# Patient Record
Sex: MALE | Race: WHITE | NOT HISPANIC OR LATINO | Employment: FULL TIME | URBAN - METROPOLITAN AREA
[De-identification: names, ages, dates, MRNs, and addresses within clinical notes are randomized per-mention and may not be internally consistent; named-entity substitution may affect disease eponyms.]

---

## 2020-03-19 ENCOUNTER — OFFICE VISIT (OUTPATIENT)
Dept: FAMILY MEDICINE CLINIC | Facility: CLINIC | Age: 35
End: 2020-03-19

## 2020-03-19 VITALS
RESPIRATION RATE: 16 BRPM | BODY MASS INDEX: 21.82 KG/M2 | HEIGHT: 74 IN | HEART RATE: 72 BPM | OXYGEN SATURATION: 99 % | SYSTOLIC BLOOD PRESSURE: 120 MMHG | WEIGHT: 170 LBS | DIASTOLIC BLOOD PRESSURE: 80 MMHG

## 2020-03-19 DIAGNOSIS — J45.20 MILD INTERMITTENT ASTHMA WITHOUT COMPLICATION: ICD-10-CM

## 2020-03-19 DIAGNOSIS — F90.0 ADHD (ATTENTION DEFICIT HYPERACTIVITY DISORDER), INATTENTIVE TYPE: ICD-10-CM

## 2020-03-19 DIAGNOSIS — Z79.899 HIGH RISK MEDICATION USE: ICD-10-CM

## 2020-03-19 DIAGNOSIS — Z11.59 ENCOUNTER FOR HEPATITIS C SCREENING TEST FOR LOW RISK PATIENT: ICD-10-CM

## 2020-03-19 DIAGNOSIS — Z00.00 ANNUAL PHYSICAL EXAM: Primary | ICD-10-CM

## 2020-03-19 PROCEDURE — 99395 PREV VISIT EST AGE 18-39: CPT | Performed by: NURSE PRACTITIONER

## 2020-03-19 RX ORDER — DEXTROAMPHETAMINE SACCHARATE, AMPHETAMINE ASPARTATE MONOHYDRATE, DEXTROAMPHETAMINE SULFATE AND AMPHETAMINE SULFATE 5; 5; 5; 5 MG/1; MG/1; MG/1; MG/1
20 CAPSULE, EXTENDED RELEASE ORAL EVERY MORNING
Qty: 30 CAPSULE | Refills: 0
Start: 2020-03-19 | End: 2020-03-20 | Stop reason: SDUPTHER

## 2020-03-19 RX ORDER — ALBUTEROL SULFATE 90 UG/1
2 AEROSOL, METERED RESPIRATORY (INHALATION) EVERY 4 HOURS PRN
Qty: 1 INHALER | Refills: 2 | Status: SHIPPED | OUTPATIENT
Start: 2020-03-19 | End: 2020-10-21 | Stop reason: SDUPTHER

## 2020-03-19 NOTE — PROGRESS NOTES
"Patient ID: Bora Siddiqui is a 34 y.o. male     Subjective     Chief Complaint   Patient presents with   • Establish Care   • ADHD   • Annual Exam       History of Present Illness    Bora Siddiqui presents to the office today to establish care with our office for annual physical exam.  His main concerns is ADHD. Worsening symptoms over the past 1-2 years.  Difficulty staying on task and finish task in timely manner.  Works as  at [employer].  Recently admonished at work due to decreased attention span and able to complete task.    ADHD testing completed per Lizandro Damon LPA on 2/25/2020. Recommend medication management.  Tried different strategies to cope with symptoms however was unsuccessful.  He attempted to take frequent notes and construct list to stay on track.    During school, he was a \"B\" student. However he feels he possibly suffered from attention deficit however was never treated for condition.      Past medical history consist of asthma.  Usually controlled with as needed albuterol inhaler.  Symptoms cause intermittent flareups during change of seasons.  He has concerns about working in the public due to his history of asthma.    Health Habits:  Dental Exam: Not Up to date  Eye Exam: Up to date  Diet:Mostly vegetarian  Exercise: 3 times/week.  Current exercise activities include: walking and hiking.  Walks 10 miles a day.      He denies any complaints of fever, chills, cough, chest pain, shortness of air, abdominal pain, nausea, or any other concerns.     The following portions of the patient's history were reviewed and updated as appropriate: allergies, current medications, past family history, past medical history, past social history, past surgical history and problem list.       Review of Systems   Constitution: Negative.   HENT: Negative.    Eyes: Negative.    Cardiovascular: Negative.    Respiratory: Negative.    Endocrine: Negative.    Hematologic/Lymphatic: Negative.    Skin: Negative.  "   Musculoskeletal: Negative.    Gastrointestinal: Negative.    Genitourinary: Negative.    Neurological: Negative.    Psychiatric/Behavioral: Negative.         ADHD       Vitals:    03/19/20 0924   BP: 120/80   Pulse: 72   Resp: 16   SpO2: 99%       Documented weights    03/19/20 0924   Weight: 77.1 kg (170 lb)     Body mass index is 21.97 kg/m².    No results found for this or any previous visit.    Objective     Physical Exam   Constitutional: He is oriented to person, place, and time. Vital signs are normal. He appears well-developed.   HENT:   Head: Normocephalic and atraumatic.   Right Ear: Tympanic membrane normal.   Left Ear: Tympanic membrane normal.   Mouth/Throat: Oropharynx is clear and moist.   Eyes: Pupils are equal, round, and reactive to light. EOM are normal.   Neck: Normal range of motion. Neck supple.   Cardiovascular: Normal rate, regular rhythm, normal heart sounds and intact distal pulses.   No murmur heard.  Pulmonary/Chest: Effort normal and breath sounds normal. He has no wheezes. He has no rhonchi. He has no rales.   Abdominal: Soft. Bowel sounds are normal. There is no hepatosplenomegaly. There is no tenderness.   Musculoskeletal: Normal range of motion. He exhibits no edema or tenderness.   Neurological: He is alert and oriented to person, place, and time. He has normal strength.   Skin: Skin is warm and dry. No rash noted. No cyanosis or erythema.   Psychiatric: He has a normal mood and affect. His behavior is normal.       Assessment/Plan     Assessment/Plan     Bora was seen today for [facility] care, adhd and annual exam.    Diagnoses and all orders for this visit:    Annual physical exam  -     CBC & Differential; Future  -     Comprehensive Metabolic Panel; Future  -     Lipid Panel; Future  -     TSH; Future  -     Vitamin D 25 Hydroxy; Future    Mild intermittent asthma without complication  -     albuterol sulfate  (90 Base) MCG/ACT inhaler; Inhale 2 puffs Every 4 (Four)  Hours As Needed for Wheezing or Shortness of Air.    High risk medication use  -     Compliance Drug Analysis, Ur - Urine, Clean Catch    Encounter for hepatitis C screening test for low risk patient  -     Hepatitis C Antibody; Future    ADHD (attention deficit hyperactivity disorder), inattentive type  -     amphetamine-dextroamphetamine XR (Adderall XR) 20 MG 24 hr capsule; Take 1 capsule by mouth Every Morning      Summary:  Bora Siddiqui presents to the office today for new patient evaluation.  He is not fasting today.  We will have him scheduled for a fasting lab appointment for annual physical and will call him with results.  Albuterol inhaler given to use as needed for wheezing or shortness of breath.  Note given for work due to his history of asthma.  He had office records from psychologist office on his iPad which I have reviewed.  Instructed will need to obtain copy for his chart however do feel he would benefit from medication for control of his ADHD.  Especially since he has tried other measures and was unsuccessful in staying on task.  Instructed we will start him on Adderall XR 20 mg daily.  We will have him return in 1 month for medication recheck appointment.    In the meantime, instructed to contact us sooner for any problems or concerns.    The patient has read and signed the [facility] Controlled Substance Contract. I will continue to see patient for regular follow up appointments. [facility] #[id number] obtained on 3/19/2020 was reviewed and is compliant and will be updated at least every 3 months. The patient is aware of the potential for addiction and dependence. Will send for new prescription request to Dr. Alvarez for Adderall XR 20 mg daily #30.  No refill.  UDS completed today.  F/U in 4 weeks for medication recheck.    Patient Counseling:  --Nutrition: Stressed importance of moderation in sodium/caffeine intake, saturated fat and cholesterol.  Discussed caloric balance, sufficient intake of  fresh fruits, vegetables, fiber,   calcium, iron.  --Exercise: Stressed the importance of regular exercise by incorporating into daily routine.    --Substance Abuse: Discussed cessation/primary prevention of tobacco, alcohol, or other drug use; driving or other dangerous activities under the influence.    --Dental health: Discussed importance of regular tooth brushing, flossing, and dental visits.  -- Suggested having eyes and vision checked if needed or past due.  --Immunizations reviewed.    Queta Mason, APRN  Family Medicine  [facility] [place]  03/20/20  09:20

## 2020-03-20 DIAGNOSIS — F90.0 ADHD (ATTENTION DEFICIT HYPERACTIVITY DISORDER), INATTENTIVE TYPE: ICD-10-CM

## 2020-03-21 RX ORDER — DEXTROAMPHETAMINE SACCHARATE, AMPHETAMINE ASPARTATE MONOHYDRATE, DEXTROAMPHETAMINE SULFATE AND AMPHETAMINE SULFATE 5; 5; 5; 5 MG/1; MG/1; MG/1; MG/1
20 CAPSULE, EXTENDED RELEASE ORAL EVERY MORNING
Qty: 30 CAPSULE | Refills: 0 | Status: SHIPPED | OUTPATIENT
Start: 2020-03-21 | End: 2020-04-16 | Stop reason: SDUPTHER

## 2020-03-24 LAB — DRUGS UR: NORMAL

## 2020-03-26 ENCOUNTER — RESULTS ENCOUNTER (OUTPATIENT)
Dept: FAMILY MEDICINE CLINIC | Facility: CLINIC | Age: 35
End: 2020-03-26

## 2020-03-26 DIAGNOSIS — Z11.59 ENCOUNTER FOR HEPATITIS C SCREENING TEST FOR LOW RISK PATIENT: ICD-10-CM

## 2020-03-26 DIAGNOSIS — Z00.00 ANNUAL PHYSICAL EXAM: ICD-10-CM

## 2020-04-16 ENCOUNTER — TELEMEDICINE (OUTPATIENT)
Dept: FAMILY MEDICINE CLINIC | Facility: CLINIC | Age: 35
End: 2020-04-16

## 2020-04-16 DIAGNOSIS — F90.0 ADHD (ATTENTION DEFICIT HYPERACTIVITY DISORDER), INATTENTIVE TYPE: Primary | ICD-10-CM

## 2020-04-16 PROCEDURE — 99213 OFFICE O/P EST LOW 20 MIN: CPT | Performed by: FAMILY MEDICINE

## 2020-04-16 RX ORDER — DEXTROAMPHETAMINE SACCHARATE, AMPHETAMINE ASPARTATE MONOHYDRATE, DEXTROAMPHETAMINE SULFATE AND AMPHETAMINE SULFATE 5; 5; 5; 5 MG/1; MG/1; MG/1; MG/1
20 CAPSULE, EXTENDED RELEASE ORAL EVERY MORNING
Qty: 30 CAPSULE | Refills: 0 | Status: SHIPPED | OUTPATIENT
Start: 2020-04-16 | End: 2020-05-18 | Stop reason: SDUPTHER

## 2020-04-16 NOTE — PROGRESS NOTES
Subjective   Bora Siddiqui is a 34 y.o. male.     History of Present Illness   34-year-old white male with newly diagnosed ADHD-combined type here during video/telephone visit for further medical management of same.  Patient usually sees BETITO Hernandez and was last seen 1 month ago.  He was started on Adderall XR 20 mg every morning which while he was working he was using somewhere in the 830 to 9:00 in the morning range.  He would feel the effectiveness of it approximately 30 to 45 minutes after consumption of the tablet and this would be on board until approximately 730 to 8:00 at night.  Initially he did have some nausea approximately midday which has seemed to resolve with continued use.  He has noted improvement in focus and maintenance of concentration throughout his workday.  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  Patient's wife does state that he gets more done and is less easily distracted.  He feels that his focus is improved and that he is much more productive.  He denies any other side effects including no evidence of tremor, tachyarrhythmia, decreased appetite or insomnia.  He was under the impression he had to use this every day and so was doing so.  The following portions of the patient's history were reviewed and updated as appropriate: allergies, current medications, past family history, past medical history, past social history, past surgical history and problem list.    Review of Systems   Psychiatric/Behavioral: Positive for decreased concentration.       Objective   Physical Exam   Constitutional: He is oriented to person, place, and time.   Neurological: He is alert and oriented to  person, place, and time.   Psychiatric: He has a normal mood and affect. His speech is normal and behavior is normal. Judgment and thought content normal. Cognition and memory are normal.   Further exam has not been accomplished given this type of visit.      Assessment/Plan   Bora was seen today for add.    Diagnoses and all orders for this visit:    ADHD (attention deficit hyperactivity disorder), inattentive type  -     amphetamine-dextroamphetamine XR (Adderall XR) 20 MG 24 hr capsule; Take 1 capsule by mouth Every Morning    Have discussed with patient that we may not fully know the effectiveness of this product and whether this will be the appropriate medication and dose until after he begins to work again.  Again he will be a  for a [employer] store and will not start to 4 May.  His schedule vary in time will tell whether it adjustment of this medication will need to be made.  He certainly seems to be a slow metabolizer of this product often getting approximately 12 hours out of the extended release form of this product.  Anticipate follow-up with him in approximately 4 months with SERAFIN Hernandez

## 2020-05-18 DIAGNOSIS — F90.0 ADHD (ATTENTION DEFICIT HYPERACTIVITY DISORDER), INATTENTIVE TYPE: ICD-10-CM

## 2020-05-18 RX ORDER — DEXTROAMPHETAMINE SACCHARATE, AMPHETAMINE ASPARTATE MONOHYDRATE, DEXTROAMPHETAMINE SULFATE AND AMPHETAMINE SULFATE 5; 5; 5; 5 MG/1; MG/1; MG/1; MG/1
20 CAPSULE, EXTENDED RELEASE ORAL EVERY MORNING
Qty: 30 CAPSULE | Refills: 0 | Status: SHIPPED | OUTPATIENT
Start: 2020-05-18 | End: 2020-06-15 | Stop reason: SDUPTHER

## 2020-05-18 NOTE — TELEPHONE ENCOUNTER
Last OV: 03/19/2020  Last UDS: 03/19/2020  Last Eleno: 03/19/2020  Last Refill: 04/16/2020   Normal for race

## 2020-06-15 DIAGNOSIS — F90.0 ADHD (ATTENTION DEFICIT HYPERACTIVITY DISORDER), INATTENTIVE TYPE: ICD-10-CM

## 2020-06-15 RX ORDER — DEXTROAMPHETAMINE SACCHARATE, AMPHETAMINE ASPARTATE MONOHYDRATE, DEXTROAMPHETAMINE SULFATE AND AMPHETAMINE SULFATE 5; 5; 5; 5 MG/1; MG/1; MG/1; MG/1
20 CAPSULE, EXTENDED RELEASE ORAL EVERY MORNING
Qty: 30 CAPSULE | Refills: 0 | Status: SHIPPED | OUTPATIENT
Start: 2020-06-15 | End: 2020-07-18 | Stop reason: SDUPTHER

## 2020-07-18 DIAGNOSIS — F90.0 ADHD (ATTENTION DEFICIT HYPERACTIVITY DISORDER), INATTENTIVE TYPE: ICD-10-CM

## 2020-07-20 RX ORDER — DEXTROAMPHETAMINE SACCHARATE, AMPHETAMINE ASPARTATE MONOHYDRATE, DEXTROAMPHETAMINE SULFATE AND AMPHETAMINE SULFATE 5; 5; 5; 5 MG/1; MG/1; MG/1; MG/1
20 CAPSULE, EXTENDED RELEASE ORAL EVERY MORNING
Qty: 30 CAPSULE | Refills: 0 | Status: SHIPPED | OUTPATIENT
Start: 2020-07-20 | End: 2020-08-14 | Stop reason: SDUPTHER

## 2020-08-14 DIAGNOSIS — F90.0 ADHD (ATTENTION DEFICIT HYPERACTIVITY DISORDER), INATTENTIVE TYPE: ICD-10-CM

## 2020-08-14 NOTE — TELEPHONE ENCOUNTER
He sent [employer] message about possible change in medication dose.  He is due for 4 month follow-up.  Need to make sure he schedule before sending in refill.  If out of meds, can refill current dose until seen.

## 2020-08-17 RX ORDER — DEXTROAMPHETAMINE SACCHARATE, AMPHETAMINE ASPARTATE MONOHYDRATE, DEXTROAMPHETAMINE SULFATE AND AMPHETAMINE SULFATE 5; 5; 5; 5 MG/1; MG/1; MG/1; MG/1
20 CAPSULE, EXTENDED RELEASE ORAL EVERY MORNING
Qty: 30 CAPSULE | Refills: 0 | Status: SHIPPED | OUTPATIENT
Start: 2020-08-17 | End: 2020-08-20 | Stop reason: DRUGHIGH

## 2020-08-17 NOTE — TELEPHONE ENCOUNTER
Pt says he is out.  He would like to refill current dose and discuss changing dose at OV on Thursday.

## 2020-08-20 ENCOUNTER — TELEMEDICINE (OUTPATIENT)
Dept: FAMILY MEDICINE CLINIC | Facility: CLINIC | Age: 35
End: 2020-08-20

## 2020-08-20 DIAGNOSIS — F90.0 ADHD (ATTENTION DEFICIT HYPERACTIVITY DISORDER), INATTENTIVE TYPE: ICD-10-CM

## 2020-08-20 PROCEDURE — 99213 OFFICE O/P EST LOW 20 MIN: CPT | Performed by: NURSE PRACTITIONER

## 2020-08-20 RX ORDER — DEXTROAMPHETAMINE SACCHARATE, AMPHETAMINE ASPARTATE, DEXTROAMPHETAMINE SULFATE AND AMPHETAMINE SULFATE 5; 5; 5; 5 MG/1; MG/1; MG/1; MG/1
20 TABLET ORAL 2 TIMES DAILY
Qty: 60 TABLET | Refills: 0
Start: 2020-08-20 | End: 2020-08-21 | Stop reason: SDUPTHER

## 2020-08-20 NOTE — PROGRESS NOTES
"Bora Siddiqui is a 34 y.o. who presents today for an E-visit with complaints of ADHD    Time spent caring for the patient was 5 - 10 min.    Patient ID: Bora Siddiqui is a 34 y.o. male     Subjective     Chief Complaint   Patient presents with   • ADD       History of Present Illness    Bora Siddiqui presents to the office today for e-visit for continued management of ADHD.    Has been taking Adderall XR 20 mg daily since March 2020. Usually takes medication at 0530. Usually helps him stay on task until about 1300 where he feels like he is \"zoning out\". Works till 1500.  Crashes when he gets home. Has to take a nap when he gets home which is unusual for him.  Initially thought his symptoms would improve after taking medication for awhile however has not.  Denies any adverse effects such as chest pain, heart palpitations, unintentional weight loss, decreased appetite, or any other concerns.    The following portions of the patient's history were reviewed and updated as appropriate: allergies, current medications, past family history, past medical history, past social history, past surgical history and problem list.       Review of Systems   Constitution: Positive for malaise/fatigue.   HENT: Negative.    Eyes: Negative.    Cardiovascular: Negative.    Respiratory: Negative.    Endocrine: Negative.    Hematologic/Lymphatic: Negative.    Skin: Negative.    Musculoskeletal: Negative.    Gastrointestinal: Negative.    Genitourinary: Negative.    Neurological: Negative.    Psychiatric/Behavioral: Negative.         ADD       There were no vitals filed for this visit.    There were no vitals filed for this visit.  There is no height or weight on file to calculate BMI.    Results for orders placed or performed in visit on 03/19/20   Compliance Drug Analysis, Ur - Urine, Clean Catch   Result Value Ref Range    Report Summary FINAL        Objective     Physical Exam   Constitutional: He is oriented to person, place, and time. " No distress.   Neurological: He is alert and oriented to person, place, and time.   Psychiatric: He has a normal mood and affect. His speech is normal. Thought content normal. Cognition and memory are normal.          Assessment/Plan     Assessment/Plan     oBra was seen today for add.    Diagnoses and all orders for this visit:    ADHD (attention deficit hyperactivity disorder), inattentive type  -     amphetamine-dextroamphetamine (Adderall) 20 MG tablet; Take 1 tablet by mouth 2 (Two) Times a Day.        Summary:  Bora Siddiqui present for video visit today concerning continue management of his ADHD medication.  Feel he would benefit from switching from extended release to twice a day dosing to see if helps control his symptoms better.  Will send med request for Adderall 20 mg twice a day #60 to Dr. Alvarez.  Instructed to follow-up with us in approximately 4 weeks to see how he is doing on current dosage.  Return to office in 4 months for next medication recheck appointment.    In the meantime, instructed to contact us sooner for any problems or concerns.      The patient has read and signed the [facility] Controlled Substance Contract. I will continue to see patient for regular follow up appointments. They are well controlled on their medication.  [facility] #[id number] obtained on 8/20/2020 was reviewed and is compliant and will be updated at least every 3 months. The patient is aware of the potential for addiction and dependence.  UDS completed March 2020.        There are no Patient Instructions on file for this visit.      Queta Mason, APRN  Family Medicine  [facility] [place] [facility]  08/20/20  17:16

## 2020-08-21 ENCOUNTER — TELEPHONE (OUTPATIENT)
Dept: FAMILY MEDICINE CLINIC | Facility: CLINIC | Age: 35
End: 2020-08-21

## 2020-08-21 DIAGNOSIS — F90.0 ADHD (ATTENTION DEFICIT HYPERACTIVITY DISORDER), INATTENTIVE TYPE: ICD-10-CM

## 2020-08-21 RX ORDER — DEXTROAMPHETAMINE SACCHARATE, AMPHETAMINE ASPARTATE, DEXTROAMPHETAMINE SULFATE AND AMPHETAMINE SULFATE 5; 5; 5; 5 MG/1; MG/1; MG/1; MG/1
20 TABLET ORAL 2 TIMES DAILY
Qty: 60 TABLET | Refills: 0
Start: 2020-08-21 | End: 2020-08-21 | Stop reason: SDUPTHER

## 2020-08-21 RX ORDER — DEXTROAMPHETAMINE SACCHARATE, AMPHETAMINE ASPARTATE, DEXTROAMPHETAMINE SULFATE AND AMPHETAMINE SULFATE 5; 5; 5; 5 MG/1; MG/1; MG/1; MG/1
20 TABLET ORAL 2 TIMES DAILY
Qty: 60 TABLET | Refills: 0 | Status: SHIPPED | OUTPATIENT
Start: 2020-08-21 | End: 2020-09-23 | Stop reason: SDUPTHER

## 2020-09-23 DIAGNOSIS — F90.0 ADHD (ATTENTION DEFICIT HYPERACTIVITY DISORDER), INATTENTIVE TYPE: ICD-10-CM

## 2020-09-23 RX ORDER — DEXTROAMPHETAMINE SACCHARATE, AMPHETAMINE ASPARTATE, DEXTROAMPHETAMINE SULFATE AND AMPHETAMINE SULFATE 5; 5; 5; 5 MG/1; MG/1; MG/1; MG/1
20 TABLET ORAL 2 TIMES DAILY
Qty: 60 TABLET | Refills: 0 | Status: SHIPPED | OUTPATIENT
Start: 2020-09-23 | End: 2020-10-21 | Stop reason: SDUPTHER

## 2020-10-21 DIAGNOSIS — F90.0 ADHD (ATTENTION DEFICIT HYPERACTIVITY DISORDER), INATTENTIVE TYPE: ICD-10-CM

## 2020-10-21 DIAGNOSIS — J45.20 MILD INTERMITTENT ASTHMA WITHOUT COMPLICATION: ICD-10-CM

## 2020-10-21 RX ORDER — ALBUTEROL SULFATE 90 UG/1
2 AEROSOL, METERED RESPIRATORY (INHALATION) EVERY 4 HOURS PRN
Qty: 18 G | Refills: 0 | Status: SHIPPED | OUTPATIENT
Start: 2020-10-21 | End: 2022-10-12 | Stop reason: SDUPTHER

## 2020-10-21 RX ORDER — DEXTROAMPHETAMINE SACCHARATE, AMPHETAMINE ASPARTATE, DEXTROAMPHETAMINE SULFATE AND AMPHETAMINE SULFATE 5; 5; 5; 5 MG/1; MG/1; MG/1; MG/1
20 TABLET ORAL 2 TIMES DAILY
Qty: 60 TABLET | Refills: 0 | Status: SHIPPED | OUTPATIENT
Start: 2020-10-21 | End: 2020-11-25 | Stop reason: SDUPTHER

## 2020-11-25 DIAGNOSIS — F90.0 ADHD (ATTENTION DEFICIT HYPERACTIVITY DISORDER), INATTENTIVE TYPE: ICD-10-CM

## 2020-11-25 RX ORDER — DEXTROAMPHETAMINE SACCHARATE, AMPHETAMINE ASPARTATE, DEXTROAMPHETAMINE SULFATE AND AMPHETAMINE SULFATE 5; 5; 5; 5 MG/1; MG/1; MG/1; MG/1
20 TABLET ORAL 2 TIMES DAILY
Qty: 60 TABLET | Refills: 0 | Status: SHIPPED | OUTPATIENT
Start: 2020-11-25 | End: 2021-01-06 | Stop reason: SDUPTHER

## 2020-12-29 DIAGNOSIS — F90.0 ADHD (ATTENTION DEFICIT HYPERACTIVITY DISORDER), INATTENTIVE TYPE: ICD-10-CM

## 2020-12-30 RX ORDER — DEXTROAMPHETAMINE SACCHARATE, AMPHETAMINE ASPARTATE, DEXTROAMPHETAMINE SULFATE AND AMPHETAMINE SULFATE 5; 5; 5; 5 MG/1; MG/1; MG/1; MG/1
20 TABLET ORAL 2 TIMES DAILY
Qty: 60 TABLET | Refills: 0 | OUTPATIENT
Start: 2020-12-30

## 2021-01-05 ENCOUNTER — OFFICE VISIT (OUTPATIENT)
Dept: FAMILY MEDICINE CLINIC | Facility: CLINIC | Age: 36
End: 2021-01-05

## 2021-01-05 VITALS
DIASTOLIC BLOOD PRESSURE: 80 MMHG | TEMPERATURE: 98 F | WEIGHT: 179 LBS | HEIGHT: 74 IN | BODY MASS INDEX: 22.97 KG/M2 | OXYGEN SATURATION: 98 % | SYSTOLIC BLOOD PRESSURE: 138 MMHG | HEART RATE: 98 BPM | RESPIRATION RATE: 16 BRPM

## 2021-01-05 DIAGNOSIS — F90.0 ADHD (ATTENTION DEFICIT HYPERACTIVITY DISORDER), INATTENTIVE TYPE: Primary | ICD-10-CM

## 2021-01-05 PROCEDURE — 99213 OFFICE O/P EST LOW 20 MIN: CPT | Performed by: NURSE PRACTITIONER

## 2021-01-05 NOTE — PROGRESS NOTES
"Answers for HPI/ROS submitted by the patient on 1/5/2021   What is the primary reason for your visit?: Other  Please describe your symptoms.: None. Visit is for perscription update.  Have you had these symptoms before?: Yes  How long have you been having these symptoms?: Greater than 2 weeks  Please list any medications you are currently taking for this condition.: Adderall      Chief Complaint   Patient presents with   • ADHD       Subjective   Bora Siddiqui 35 y.o. male who presents for follow up of for  ADD/ADHD. Patient is well controlled on their current Rx.  When he was last seen, he was having problems with medication wearing off and crushing at the end of the day.  At that time he was taken Adderall extended release 20 mg daily.  Advised to switch to 20 mg twice a day.  States this dosage is working better for him.  Will take the second dose during the day depending on activities.  At times if not needed, he does not need second dose during the day.  Doing well on this medication regimen.  No new problems with: insomnia, tachycardia, anxiety, elevated blood pressure or weight loss.     I will continue to see patient for regular follow up appointments.    The medication continues to help with: concentration, finishing tasks in timely manor, and succeeding at school and or at work.        History of Present Illness     The following portions of the patient's history were reviewed and updated as appropriate: allergies, current medications, past family history, past medical history, past social history, past surgical history and problem list.    Review of Systems   Psychiatric/Behavioral:        ADHD       Vitals:    01/05/21 1539   BP: 138/80   BP Location: Left arm   Patient Position: Sitting   Cuff Size: Adult   Pulse: 98   Resp: 16   Temp: 98 °F (36.7 °C)   SpO2: 98%   Weight: 81.2 kg (179 lb)   Height: 187.3 cm (73.75\")     Wt Readings from Last 3 Encounters:   01/05/21 81.2 kg (179 lb)   03/19/20 77.1 kg " (170 lb)     Objective   General:  Alert, pleasant, no distress  HEENT:  Normocephalic and atraumatic  Neck:  No thyroid megaly nor lymphadenopathy  Lungs: Normal respiratory rate, Clear auscultation bilaterally.    Heart:: Regular rate, no murmur  Skin: No rash  Neuro:  Cranial nerves grossly normal. No tremor  Psych:  Mood stable, clear thought process    Assessment/Plan   Diagnoses and all orders for this visit:    1. ADHD (attention deficit hyperactivity disorder), inattentive type (Primary)   Continue Adderall 20 mg twice a day as directed.      The patient has read and signed the [facility] Controlled Substance Contract. I will continue to see patient for regular follow up appointments. They are well controlled on their medication.  SAEID was reviewed and is compliant and will be updated at least every 3 months. The patient is aware of the potential for addiction and dependence.       There are no discontinued medications.     There are no Patient Instructions on file for this visit.  Return in about 4 months (around 5/5/2021) for Recheck, Annual.      Queta Mason, APRN

## 2021-01-06 DIAGNOSIS — F90.0 ADHD (ATTENTION DEFICIT HYPERACTIVITY DISORDER), INATTENTIVE TYPE: ICD-10-CM

## 2021-01-06 RX ORDER — DEXTROAMPHETAMINE SACCHARATE, AMPHETAMINE ASPARTATE, DEXTROAMPHETAMINE SULFATE AND AMPHETAMINE SULFATE 5; 5; 5; 5 MG/1; MG/1; MG/1; MG/1
20 TABLET ORAL 2 TIMES DAILY
Qty: 60 TABLET | Refills: 0 | Status: SHIPPED | OUTPATIENT
Start: 2021-01-06 | End: 2021-01-08 | Stop reason: SDUPTHER

## 2021-01-08 DIAGNOSIS — F90.0 ADHD (ATTENTION DEFICIT HYPERACTIVITY DISORDER), INATTENTIVE TYPE: ICD-10-CM

## 2021-01-08 RX ORDER — DEXTROAMPHETAMINE SACCHARATE, AMPHETAMINE ASPARTATE, DEXTROAMPHETAMINE SULFATE AND AMPHETAMINE SULFATE 5; 5; 5; 5 MG/1; MG/1; MG/1; MG/1
20 TABLET ORAL 2 TIMES DAILY
Qty: 60 TABLET | Refills: 0 | Status: SHIPPED | OUTPATIENT
Start: 2021-01-08 | End: 2021-02-01 | Stop reason: SDUPTHER

## 2021-02-01 DIAGNOSIS — F90.0 ADHD (ATTENTION DEFICIT HYPERACTIVITY DISORDER), INATTENTIVE TYPE: ICD-10-CM

## 2021-02-02 RX ORDER — DEXTROAMPHETAMINE SACCHARATE, AMPHETAMINE ASPARTATE, DEXTROAMPHETAMINE SULFATE AND AMPHETAMINE SULFATE 5; 5; 5; 5 MG/1; MG/1; MG/1; MG/1
20 TABLET ORAL 2 TIMES DAILY
Qty: 60 TABLET | Refills: 0 | Status: SHIPPED | OUTPATIENT
Start: 2021-02-02 | End: 2021-03-06 | Stop reason: SDUPTHER

## 2021-03-06 DIAGNOSIS — F90.0 ADHD (ATTENTION DEFICIT HYPERACTIVITY DISORDER), INATTENTIVE TYPE: ICD-10-CM

## 2021-03-08 RX ORDER — DEXTROAMPHETAMINE SACCHARATE, AMPHETAMINE ASPARTATE, DEXTROAMPHETAMINE SULFATE AND AMPHETAMINE SULFATE 5; 5; 5; 5 MG/1; MG/1; MG/1; MG/1
20 TABLET ORAL 2 TIMES DAILY
Qty: 60 TABLET | Refills: 0 | Status: SHIPPED | OUTPATIENT
Start: 2021-03-08 | End: 2021-04-06 | Stop reason: SDUPTHER

## 2021-04-06 DIAGNOSIS — F90.0 ADHD (ATTENTION DEFICIT HYPERACTIVITY DISORDER), INATTENTIVE TYPE: ICD-10-CM

## 2021-04-06 RX ORDER — DEXTROAMPHETAMINE SACCHARATE, AMPHETAMINE ASPARTATE, DEXTROAMPHETAMINE SULFATE AND AMPHETAMINE SULFATE 5; 5; 5; 5 MG/1; MG/1; MG/1; MG/1
20 TABLET ORAL 2 TIMES DAILY
Qty: 60 TABLET | Refills: 0 | Status: SHIPPED | OUTPATIENT
Start: 2021-04-06 | End: 2021-05-13 | Stop reason: SDUPTHER

## 2021-05-04 DIAGNOSIS — Z00.00 ANNUAL PHYSICAL EXAM: Primary | ICD-10-CM

## 2021-05-04 DIAGNOSIS — F90.0 ADHD (ATTENTION DEFICIT HYPERACTIVITY DISORDER), INATTENTIVE TYPE: ICD-10-CM

## 2021-05-04 DIAGNOSIS — Z11.59 NEED FOR HEPATITIS C SCREENING TEST: ICD-10-CM

## 2021-05-04 DIAGNOSIS — Z79.899 HIGH RISK MEDICATION USE: Primary | ICD-10-CM

## 2021-05-04 DIAGNOSIS — Z79.899 HIGH RISK MEDICATION USE: ICD-10-CM

## 2021-05-04 DIAGNOSIS — Z00.00 ANNUAL PHYSICAL EXAM: ICD-10-CM

## 2021-05-11 DIAGNOSIS — F90.0 ADHD (ATTENTION DEFICIT HYPERACTIVITY DISORDER), INATTENTIVE TYPE: ICD-10-CM

## 2021-05-11 LAB
25(OH)D3+25(OH)D2 SERPL-MCNC: 23.2 NG/ML (ref 30–100)
ALBUMIN SERPL-MCNC: 4.7 G/DL (ref 4–5)
ALBUMIN/GLOB SERPL: 1.7 {RATIO} (ref 1.2–2.2)
ALP SERPL-CCNC: 65 IU/L (ref 39–117)
ALT SERPL-CCNC: 25 IU/L (ref 0–44)
AMBIG ABBREV CMP14 DEFAULT: NORMAL
AMBIG ABBREV LP DEFAULT: NORMAL
AST SERPL-CCNC: 20 IU/L (ref 0–40)
BASOPHILS # BLD AUTO: 0.1 X10E3/UL (ref 0–0.2)
BASOPHILS NFR BLD AUTO: 1 %
BILIRUB SERPL-MCNC: 0.8 MG/DL (ref 0–1.2)
BUN SERPL-MCNC: 16 MG/DL (ref 6–20)
BUN/CREAT SERPL: 19 (ref 9–20)
CALCIUM SERPL-MCNC: 9.5 MG/DL (ref 8.7–10.2)
CHLORIDE SERPL-SCNC: 101 MMOL/L (ref 96–106)
CHOLEST SERPL-MCNC: 133 MG/DL (ref 100–199)
CO2 SERPL-SCNC: 24 MMOL/L (ref 20–29)
CREAT SERPL-MCNC: 0.84 MG/DL (ref 0.76–1.27)
EOSINOPHIL # BLD AUTO: 0.2 X10E3/UL (ref 0–0.4)
EOSINOPHIL NFR BLD AUTO: 4 %
ERYTHROCYTE [DISTWIDTH] IN BLOOD BY AUTOMATED COUNT: 12.1 % (ref 11.6–15.4)
GLOBULIN SER CALC-MCNC: 2.7 G/DL (ref 1.5–4.5)
GLUCOSE SERPL-MCNC: 102 MG/DL (ref 65–99)
HCT VFR BLD AUTO: 46.3 % (ref 37.5–51)
HCV AB S/CO SERPL IA: <0.1 S/CO RATIO (ref 0–0.9)
HDLC SERPL-MCNC: 56 MG/DL
HGB BLD-MCNC: 15.4 G/DL (ref 13–17.7)
IMM GRANULOCYTES # BLD AUTO: 0 X10E3/UL (ref 0–0.1)
IMM GRANULOCYTES NFR BLD AUTO: 0 %
LDLC SERPL CALC-MCNC: 67 MG/DL (ref 0–99)
LYMPHOCYTES # BLD AUTO: 1.7 X10E3/UL (ref 0.7–3.1)
LYMPHOCYTES NFR BLD AUTO: 35 %
MCH RBC QN AUTO: 28.8 PG (ref 26.6–33)
MCHC RBC AUTO-ENTMCNC: 33.3 G/DL (ref 31.5–35.7)
MCV RBC AUTO: 87 FL (ref 79–97)
MONOCYTES # BLD AUTO: 0.4 X10E3/UL (ref 0.1–0.9)
MONOCYTES NFR BLD AUTO: 9 %
NEUTROPHILS # BLD AUTO: 2.4 X10E3/UL (ref 1.4–7)
NEUTROPHILS NFR BLD AUTO: 51 %
PLATELET # BLD AUTO: 310 X10E3/UL (ref 150–450)
POTASSIUM SERPL-SCNC: 4.8 MMOL/L (ref 3.5–5.2)
PROT SERPL-MCNC: 7.4 G/DL (ref 6–8.5)
RBC # BLD AUTO: 5.35 X10E6/UL (ref 4.14–5.8)
SODIUM SERPL-SCNC: 138 MMOL/L (ref 134–144)
TRIGL SERPL-MCNC: 43 MG/DL (ref 0–149)
TSH SERPL DL<=0.005 MIU/L-ACNC: 0.9 UIU/ML (ref 0.45–4.5)
VLDLC SERPL CALC-MCNC: 10 MG/DL (ref 5–40)
WBC # BLD AUTO: 4.8 X10E3/UL (ref 3.4–10.8)

## 2021-05-11 RX ORDER — DEXTROAMPHETAMINE SACCHARATE, AMPHETAMINE ASPARTATE, DEXTROAMPHETAMINE SULFATE AND AMPHETAMINE SULFATE 5; 5; 5; 5 MG/1; MG/1; MG/1; MG/1
20 TABLET ORAL 2 TIMES DAILY
Qty: 60 TABLET | Refills: 0 | Status: CANCELLED | OUTPATIENT
Start: 2021-05-11

## 2021-05-13 ENCOUNTER — OFFICE VISIT (OUTPATIENT)
Dept: FAMILY MEDICINE CLINIC | Facility: CLINIC | Age: 36
End: 2021-05-13

## 2021-05-13 VITALS
RESPIRATION RATE: 16 BRPM | TEMPERATURE: 98.2 F | OXYGEN SATURATION: 99 % | WEIGHT: 185 LBS | BODY MASS INDEX: 23.74 KG/M2 | SYSTOLIC BLOOD PRESSURE: 124 MMHG | HEIGHT: 74 IN | DIASTOLIC BLOOD PRESSURE: 80 MMHG | HEART RATE: 88 BPM

## 2021-05-13 DIAGNOSIS — F90.0 ADHD (ATTENTION DEFICIT HYPERACTIVITY DISORDER), INATTENTIVE TYPE: ICD-10-CM

## 2021-05-13 DIAGNOSIS — Z00.00 ANNUAL PHYSICAL EXAM: Primary | ICD-10-CM

## 2021-05-13 DIAGNOSIS — E55.9 VITAMIN D INSUFFICIENCY: ICD-10-CM

## 2021-05-13 DIAGNOSIS — Z79.899 HIGH RISK MEDICATION USE: ICD-10-CM

## 2021-05-13 PROCEDURE — 99395 PREV VISIT EST AGE 18-39: CPT | Performed by: NURSE PRACTITIONER

## 2021-05-13 RX ORDER — ERGOCALCIFEROL (VITAMIN D2) 10 MCG
400 TABLET ORAL DAILY
COMMUNITY
Start: 2021-05-13 | End: 2021-11-23 | Stop reason: SDUPTHER

## 2021-05-13 NOTE — PROGRESS NOTES
Chief Complaint   Patient presents with   • Annual Exam       Patient Care Team:  Queta Mason APRN as PCP - General (Nurse Practitioner)    Subjective   Bora Siddiqui is a 35 y.o. male and is here for a yearly physical exam. The patient reports no problems.    Do you take any herbs or supplements that were not prescribed by a doctor? no. If so, these will be added to active medication list.    Health Habits:  Dental Exam: Not up to date  Eye Exam: No problems  Diet: Mostly vegetarian  Exercise: Daily  Current exercise activities include: Walking  Received request for refill of ADHD Medication.      Queta Mason APRN  Current Outpatient Medications   Medication Sig Dispense Refill   • albuterol sulfate  (90 Base) MCG/ACT inhaler Inhale 2 puffs Every 4 (Four) Hours As Needed for Wheezing or Shortness of Air. 18 g 0   • amphetamine-dextroamphetamine (Adderall) 20 MG tablet Take 1 tablet by mouth 2 (Two) Times a Day. 60 tablet 0   • Vitamin D, Cholecalciferol, (CHOLECALCIFEROL) 10 MCG (400 UNIT) tablet Take 1 tablet by mouth Daily.       No current facility-administered medications for this visit.     Last refill date of Adderall on 4/6/2021  Medication:  # dispensed: 60  # days of med left: 1  To be picked up at [facility].    Does Bora seem to have any problems with moodiness, appetite, weight loss, or sleep? no  Any complaints by Bora about taking the medications? No  Doing well on medication since going to twice a day dosing rather than extended release.  Does not always need afternoon dose.  Depends on activities for the day.  Keeps him focused and able to stay on task.    When was he last examined for ADHD? 1/5/2021   Who is he seeing for his ADHD symptoms? SERAFIN Segovia    When is his next appointment due? 4 months  Rx request routed to Dr. Alvarez for signature.    The following portions of the patient's history were reviewed and updated as appropriate: allergies, current medications, past family  "history, past medical history, past social history, past surgical history and problem list.    Social and Family and Surgical History reviewed and updated today, see Rooming tab.    Health History, Preventive Measures and Vaccination flow sheets reviewed and updated today.    Patient's current medical chart in Epic; including previous office notes, imaging, labs, specialist's evaluation either in notes or in Media tab reviewed today.    Other pertinent medical information also reviewed thru Care Everywhere function is also reviewed today.    Review of Systems  Review of Systems   Constitutional: Negative.    HENT: Negative.    Respiratory: Negative.    Cardiovascular: Negative.    Gastrointestinal: Negative.    Endocrine: Negative.    Genitourinary: Negative.    Musculoskeletal: Negative.    Skin: Negative.    Neurological: Negative.    Hematological: Negative.    Psychiatric/Behavioral: Negative.      Vitals:    05/13/21 1534   BP: 124/80   BP Location: Left arm   Patient Position: Sitting   Cuff Size: Adult   Pulse: 88   Resp: 16   Temp: 98.2 °F (36.8 °C)   SpO2: 99%   Weight: 83.9 kg (185 lb)   Height: 187.3 cm (73.75\")       General Appearance:  Alert, cooperative, no distress, appears stated age   Head:  Normocephalic, without obvious abnormality, atraumatic   Eyes:  PERRL, conjunctiva/corneas clear, EOM's intact.   Ears:  Normal TM's and external ear canals, both ears   Nose: Nares normal, septum midline, mucosa normal, no drainage or sinus tenderness   Throat: Lips, mucosa, and tongue normal; teeth and gums normal   Neck: Supple, symmetrical, trachea midline, no adenopathy;   thyroid: No enlargement/tenderness/nodules   Back:  Symmetric, no curvature, ROM normal, no CVA tenderness   Lungs:  Clear to auscultation bilaterally, respirations even and unlabored   Chest wall:  No tenderness or deformity   Heart:  Regular rate and rhythm, S1 and S2 normal, no murmur, rub or gallop   Abdomen:  Soft, non-tender, bowel " sounds active all four quadrants,   no masses, no organomegaly           Extremities: Extremities normal, atraumatic, no cyanosis or edema   Pulses: 2+ and symmetric all extremities   Skin: Skin color, texture, turgor normal, no rashes or lesions   Lymph nodes: Cervical, supraclavicular, and axillary nodes normal   Neurologic: CNII-XII intact. Normal strength, sensation and reflexes   throughout       Results for orders placed or performed in visit on 05/10/21   Comprehensive Metabolic Panel   Result Value Ref Range    Glucose 102 (H) 65 - 99 mg/dL    BUN 16 6 - 20 mg/dL    Creatinine 0.84 0.76 - 1.27 mg/dL    eGFR Non African Am 113 >59 mL/min/1.73    eGFR African Am 130 >59 mL/min/1.73    BUN/Creatinine Ratio 19 9 - 20    Sodium 138 134 - 144 mmol/L    Potassium 4.8 3.5 - 5.2 mmol/L    Chloride 101 96 - 106 mmol/L    Total CO2 24 20 - 29 mmol/L    Calcium 9.5 8.7 - 10.2 mg/dL    Total Protein 7.4 6.0 - 8.5 g/dL    Albumin 4.7 4.0 - 5.0 g/dL    Globulin 2.7 1.5 - 4.5 g/dL    A/G Ratio 1.7 1.2 - 2.2    Total Bilirubin 0.8 0.0 - 1.2 mg/dL    Alkaline Phosphatase 65 39 - 117 IU/L    AST (SGOT) 20 0 - 40 IU/L    ALT (SGPT) 25 0 - 44 IU/L   Lipid Panel   Result Value Ref Range    Total Cholesterol 133 100 - 199 mg/dL    Triglycerides 43 0 - 149 mg/dL    HDL Cholesterol 56 >39 mg/dL    VLDL Cholesterol Shakeel 10 5 - 40 mg/dL    LDL Chol Calc (NIH) 67 0 - 99 mg/dL   TSH   Result Value Ref Range    TSH 0.898 0.450 - 4.500 uIU/mL   Vitamin D 25 Hydroxy   Result Value Ref Range    25 Hydroxy, Vitamin D 23.2 (L) 30.0 - 100.0 ng/mL   Hepatitis C Antibody   Result Value Ref Range    Hep C Virus Ab <0.1 0.0 - 0.9 s/co ratio   Ambig Abbrev CMP14 Default   Result Value Ref Range    Ambig Abbrev CMP14 Default Comment    Ambig Abbrev LP Default   Result Value Ref Range    Ambig Abbrev LP Default Comment    CBC & Differential   Result Value Ref Range    WBC 4.8 3.4 - 10.8 x10E3/uL    RBC 5.35 4.14 - 5.80 x10E6/uL    Hemoglobin 15.4 13.0  - 17.7 g/dL    Hematocrit 46.3 37.5 - 51.0 %    MCV 87 79 - 97 fL    MCH 28.8 26.6 - 33.0 pg    MCHC 33.3 31.5 - 35.7 g/dL    RDW 12.1 11.6 - 15.4 %    Platelets 310 150 - 450 x10E3/uL    Neutrophil Rel % 51 Not Estab. %    Lymphocyte Rel % 35 Not Estab. %    Monocyte Rel % 9 Not Estab. %    Eosinophil Rel % 4 Not Estab. %    Basophil Rel % 1 Not Estab. %    Neutrophils Absolute 2.4 1.4 - 7.0 x10E3/uL    Lymphocytes Absolute 1.7 0.7 - 3.1 x10E3/uL    Monocytes Absolute 0.4 0.1 - 0.9 x10E3/uL    Eosinophils Absolute 0.2 0.0 - 0.4 x10E3/uL    Basophils Absolute 0.1 0.0 - 0.2 x10E3/uL    Immature Granulocyte Rel % 0 Not Estab. %    Immature Grans Absolute 0.0 0.0 - 0.1 x10E3/uL     Assessment/Plan   Healthy male exam.  Diagnoses and all orders for this visit:    1. Annual physical exam (Primary)  -     UA / M With / Rflx Culture([facility] ONLY) - Urine, Clean Catch  -     CBC & Differential; Future  -     Comprehensive Metabolic Panel; Future  -     Lipid Panel; Future  -     TSH; Future  -     UA / M With / Rflx Culture([facility] ONLY) - Urine, Clean Catch; Future    2. ADHD (attention deficit hyperactivity disorder), inattentive type  -     CBC & Differential; Future  -     Comprehensive Metabolic Panel; Future    3. High risk medication use  -     Compliance Drug Analysis, Ur - Urine, Clean Catch  -     Compliance Drug Analysis, Ur - Urine, Clean Catch; Future    4. Vitamin D insufficiency  -     Vitamin D, Cholecalciferol, (CHOLECALCIFEROL) 10 MCG (400 UNIT) tablet; Take 1 tablet by mouth Daily.  -     Vitamin D 25 Hydroxy; Future      1. Bora Siddiqui has been doing well since he was last seen.  Reviewed recent labs along with patient.  All appear stable except for vitamin D low at 23.  Instructed to take over-the-counter vitamin D supplement daily.  Thyroid, kidney, and liver function test all normal.  We will collect urine in office today for urinalysis and urine drug screen.  We will have him return in 4 months for  next ADHD medication recheck appointment.  In the meantime instructed contact us sooner for any problems or concerns.  2. Patient Counseling:  --Nutrition: Stressed importance of moderation in sodium/caffeine intake,      saturated fat and cholesterol.  Discussed caloric balance, sufficient intake of fresh fruits, vegetables, fiber, calcium, iron.  --Exercise: Stressed the importance of regular exercise.   --Substance Abuse: Discussed cessation/primary prevention of tobacco, alcohol,   or other drug use; driving or other dangerous activities under the influence.    --Dental health: Discussed importance of regular tooth brushing, flossing, and       dental visits.  --Suggested having eyes and vision checked if needed or past due.  --Immunizations reviewed.  3. Discussed the patient's BMI with him.  The BMI is in the acceptable range  4. Follow up next physical in 1 year    Patient was given instructions and counseling regarding condition or for health maintenance advice.  Please see specific information pulled into the AVS if appropriate.        The patient has read and signed the [facility] Controlled Substance Contract. I will continue to see patient for regular follow up appointments. They are well controlled on their medication.  SAEID was reviewed and is compliant and will be updated at least every 3 months. The patient is aware of the potential for addiction and dependence.      There are no discontinued medications.       Patient was wearing facemask when I entered the room and throughout our encounter. Protective equipment was worn throughout this patient encounter including a face mask and gloves. Hand hygiene was performed before donning protective equipment and after removal when leaving the room.     Queta Mason, SERAFIN  Family Practice  [facility] [place] [facility]

## 2021-05-15 RX ORDER — DEXTROAMPHETAMINE SACCHARATE, AMPHETAMINE ASPARTATE, DEXTROAMPHETAMINE SULFATE AND AMPHETAMINE SULFATE 5; 5; 5; 5 MG/1; MG/1; MG/1; MG/1
20 TABLET ORAL 2 TIMES DAILY
Qty: 60 TABLET | Refills: 0 | Status: SHIPPED | OUTPATIENT
Start: 2021-05-15 | End: 2021-06-17 | Stop reason: SDUPTHER

## 2021-05-19 LAB
APPEARANCE UR: CLEAR
BACTERIA #/AREA URNS HPF: NORMAL /[HPF]
BILIRUB UR QL STRIP: NEGATIVE
CASTS URNS QL MICRO: NORMAL /LPF
COLOR UR: YELLOW
DRUGS UR: NORMAL
EPI CELLS #/AREA URNS HPF: NORMAL /HPF (ref 0–10)
GLUCOSE UR QL: NEGATIVE
HGB UR QL STRIP: NEGATIVE
KETONES UR QL STRIP: NEGATIVE
LEUKOCYTE ESTERASE UR QL STRIP: NEGATIVE
MICRO URNS: NORMAL
MICRO URNS: NORMAL
NITRITE UR QL STRIP: NEGATIVE
PH UR STRIP: 6.5 [PH] (ref 5–7.5)
PROT UR QL STRIP: NEGATIVE
RBC #/AREA URNS HPF: NORMAL /HPF (ref 0–2)
SP GR UR: 1.02 (ref 1–1.03)
URINALYSIS REFLEX: NORMAL
UROBILINOGEN UR STRIP-MCNC: 0.2 MG/DL (ref 0.2–1)
WBC #/AREA URNS HPF: NORMAL /HPF (ref 0–5)

## 2021-06-15 DIAGNOSIS — F90.0 ADHD (ATTENTION DEFICIT HYPERACTIVITY DISORDER), INATTENTIVE TYPE: ICD-10-CM

## 2021-06-15 RX ORDER — DEXTROAMPHETAMINE SACCHARATE, AMPHETAMINE ASPARTATE, DEXTROAMPHETAMINE SULFATE AND AMPHETAMINE SULFATE 5; 5; 5; 5 MG/1; MG/1; MG/1; MG/1
20 TABLET ORAL 2 TIMES DAILY
Qty: 60 TABLET | Refills: 0 | OUTPATIENT
Start: 2021-06-15

## 2021-06-17 DIAGNOSIS — F90.0 ADHD (ATTENTION DEFICIT HYPERACTIVITY DISORDER), INATTENTIVE TYPE: ICD-10-CM

## 2021-06-17 RX ORDER — DEXTROAMPHETAMINE SACCHARATE, AMPHETAMINE ASPARTATE, DEXTROAMPHETAMINE SULFATE AND AMPHETAMINE SULFATE 5; 5; 5; 5 MG/1; MG/1; MG/1; MG/1
20 TABLET ORAL 2 TIMES DAILY
Qty: 60 TABLET | Refills: 0 | Status: SHIPPED | OUTPATIENT
Start: 2021-06-17 | End: 2021-07-09 | Stop reason: SDUPTHER

## 2021-07-09 DIAGNOSIS — F90.0 ADHD (ATTENTION DEFICIT HYPERACTIVITY DISORDER), INATTENTIVE TYPE: ICD-10-CM

## 2021-07-10 RX ORDER — DEXTROAMPHETAMINE SACCHARATE, AMPHETAMINE ASPARTATE, DEXTROAMPHETAMINE SULFATE AND AMPHETAMINE SULFATE 5; 5; 5; 5 MG/1; MG/1; MG/1; MG/1
20 TABLET ORAL 2 TIMES DAILY
Qty: 60 TABLET | Refills: 0 | Status: SHIPPED | OUTPATIENT
Start: 2021-07-10 | End: 2021-08-17 | Stop reason: SDUPTHER

## 2021-08-17 DIAGNOSIS — F90.0 ADHD (ATTENTION DEFICIT HYPERACTIVITY DISORDER), INATTENTIVE TYPE: ICD-10-CM

## 2021-08-21 RX ORDER — DEXTROAMPHETAMINE SACCHARATE, AMPHETAMINE ASPARTATE, DEXTROAMPHETAMINE SULFATE AND AMPHETAMINE SULFATE 5; 5; 5; 5 MG/1; MG/1; MG/1; MG/1
20 TABLET ORAL 2 TIMES DAILY
Qty: 60 TABLET | Refills: 0 | Status: SHIPPED | OUTPATIENT
Start: 2021-08-21 | End: 2021-09-21 | Stop reason: SDUPTHER

## 2021-09-13 ENCOUNTER — OFFICE VISIT (OUTPATIENT)
Dept: FAMILY MEDICINE CLINIC | Facility: CLINIC | Age: 36
End: 2021-09-13

## 2021-09-13 VITALS
HEIGHT: 74 IN | SYSTOLIC BLOOD PRESSURE: 120 MMHG | BODY MASS INDEX: 23.23 KG/M2 | WEIGHT: 181 LBS | TEMPERATURE: 98.7 F | DIASTOLIC BLOOD PRESSURE: 80 MMHG | HEART RATE: 84 BPM | OXYGEN SATURATION: 100 % | RESPIRATION RATE: 16 BRPM

## 2021-09-13 DIAGNOSIS — F90.0 ADHD (ATTENTION DEFICIT HYPERACTIVITY DISORDER), INATTENTIVE TYPE: Primary | ICD-10-CM

## 2021-09-13 PROCEDURE — 99213 OFFICE O/P EST LOW 20 MIN: CPT | Performed by: NURSE PRACTITIONER

## 2021-09-13 NOTE — PATIENT INSTRUCTIONS
Attention Deficit Hyperactivity Disorder, Adult  Attention deficit hyperactivity disorder (ADHD) is a mental health disorder that starts during childhood (neurodevelopmental disorder). For many people with ADHD, the disorder continues into the adult years. Treatment can help you manage your symptoms.  What are the causes?  The exact cause of ADHD is not known. Most experts believe genetics and environmental factors contribute to ADHD.  What increases the risk?  The following factors may make you more likely to develop this condition:  · Having a family history of ADHD.  · Being male.  · Being born to a mother who smoked or drank alcohol during pregnancy.  · Being exposed to lead or other toxins in the womb or early in life.  · Being born before 37 weeks of pregnancy (prematurely) or at a low birth weight.  · Having experienced a brain injury.  What are the signs or symptoms?  Symptoms of this condition depend on the type of ADHD. The two main types are inattentive and hyperactive-impulsive. Some people may have symptoms of both types.  Symptoms of the inattentive type include:  · Difficulty paying attention.  · Making careless mistakes.  · Not following instructions.  · Being disorganized.  · Avoiding tasks that require time and attention.  · Losing and forgetting things.  · Being easily distracted.  Symptoms of the hyperactive-impulsive type include:  · Restlessness.  · Talking too much.  · Interrupting.  · Difficulty with:  ? Sitting still.  ? Feeling motivated.  ? Relaxing.  ? Waiting in line or waiting for a turn.  In adults, this condition may lead to certain problems, such as:  · Keeping jobs.  · Performing tasks at work.  · Having stable relationships.  · Being on time or keeping to a schedule.  How is this diagnosed?  This condition is diagnosed based on your current symptoms and your history of symptoms. The diagnosis can be made by a health care provider such as a primary care provider or a mental health  care specialist.  Your health care provider may use a symptom checklist or a behavior rating scale to evaluate your symptoms. He or she may also want to talk with people who have observed your behaviors throughout your life.  How is this treated?  This condition can be treated with medicines and behavior therapy. Medicines may be the best option to reduce impulsive behaviors and improve attention. Your health care provider may recommend:  · Stimulant medicines. These are the most common medicines used for adult ADHD. They affect certain chemicals in the brain (neurotransmitters) and improve your ability to control your symptoms.  · A non-stimulant medicine for adult ADHD (atomoxetine). This medicine increases a neurotransmitter called norepinephrine. It may take weeks to months to see effects from this medicine.  Counseling and behavioral management are also important for treating ADHD. Counseling is often used along with medicine. Your health care provider may suggest:  · Cognitive behavioral therapy (CBT). This type of therapy teaches you to replace negative thoughts and actions with positive thoughts and actions. When used as part of ADHD treatment, this therapy may also include:  ? Coping strategies for organization, time management, impulse control, and stress reduction.  ? Mindfulness and meditation training.  · Behavioral management. You may work with a  who is specially trained to help people with ADHD manage and organize activities and function more effectively.  Follow these instructions at home:  Medicines    · Take over-the-counter and prescription medicines only as told by your health care provider.  · Talk with your health care provider about the possible side effects of your medicines and how to manage them.    Lifestyle    · Do not use drugs.  · Do not drink alcohol if:  ? Your health care provider tells you not to drink.  ? You are pregnant, may be pregnant, or are planning to become  pregnant.  · If you drink alcohol:  ? Limit how much you use to:  § 0-1 drink a day for women.  § 0-2 drinks a day for men.  ? Be aware of how much alcohol is in your drink. In the [place] one drink equals one 12 oz bottle of beer (355 mL), one 5 oz glass of wine (148 mL), or one 1½ oz glass of hard liquor (44 mL).  · Get enough sleep.  · Eat a healthy diet.  · Exercise regularly. Exercise can help to reduce stress and anxiety.    General instructions  · Learn as much as you can about adult ADHD, and work closely with your health care providers to find the treatments that work best for you.  · Follow the same schedule each day.  · Use reminder devices like notes, calendars, and phone apps to stay on time and organized.  · Keep all follow-up visits as told by your health care provider and therapist. This is important.  Where to find more information  A health care provider may be able to recommend resources that are available online or over the phone. You could start with:  · Attention Deficit Disorder Association (ADDA): www.add.org  · National [place] of Mental Health (NIM): www.nimh.nih.gov  Contact a health care provider if:  · Your symptoms continue to cause problems.  · You have side effects from your medicine, such as:  ? Repeated muscle twitches, coughing, or speech outbursts.  ? Sleep problems.  ? Loss of appetite.  ? Dizziness.  ? Unusually fast heartbeat.  ? Stomach pains.  ? Headaches.  · You are struggling with anxiety, depression, or substance abuse.  Get help right away if you:  · Have a severe reaction to a medicine.  If you ever feel like you may hurt yourself or others, or have thoughts about taking your own life, get help right away. You can go to the nearest emergency department or call:  · Your local emergency services (911 in the [place]  · A suicide crisis helpline, such as the National Suicide Prevention Lifeline at 1-943.365.8532. This is open 24 hours a day.  Summary  · ADHD is a mental  health disorder that starts during childhood (neurodevelopmental disorder) and often continues into the adult years.  · The exact cause of ADHD is not known. Most experts believe genetics and environmental factors contribute to ADHD.  · There is no cure for ADHD, but treatment with medicine, cognitive behavioral therapy, or behavioral management can help you manage your condition.  This information is not intended to replace advice given to you by your health care provider. Make sure you discuss any questions you have with your health care provider.  Document Revised: 05/11/2020 Document Reviewed: 05/11/2020  [employer] Patient Education © 2021 [employer]

## 2021-09-13 NOTE — PROGRESS NOTES
"Answers for HPI/ROS submitted by the patient on 9/6/2021  What is the primary reason for your visit?: Other  Please describe your symptoms.: Perscription follow-up.  Have you had these symptoms before?: Yes  How long have you been having these symptoms?: Greater than 2 weeks  Please list any medications you are currently taking for this condition.: Aderall 20mg      Chief Complaint   Patient presents with   • ADHD       Subjective   Bora Siddiqui 35 y.o. male who presents for follow up of for  ADD/ADHD. Patient is well controlled on their current Rx.    No new problems with: insomnia, tachycardia, anxiety, elevated blood pressure or weight loss.     I will continue to see patient for regular follow up appointments.    The medication continues to help with: concentration, finishing tasks in timely manor, and succeeding at school and or at work.      History of Present Illness     The following portions of the patient's history were reviewed and updated as appropriate: allergies, current medications, past family history, past medical history, past social history, past surgical history and problem list.    Review of Systems   Constitutional: Negative.    HENT: Negative.    Respiratory: Negative.    Cardiovascular: Negative.    Gastrointestinal: Negative.    Endocrine: Negative.    Genitourinary: Negative.    Musculoskeletal: Negative.    Skin: Negative.    Neurological: Negative.    Hematological: Negative.    Psychiatric/Behavioral: Negative.        Vitals:    09/13/21 1609   BP: 120/80   BP Location: Left arm   Patient Position: Sitting   Cuff Size: Adult   Pulse: 84   Resp: 16   Temp: 98.7 °F (37.1 °C)   SpO2: 100%   Weight: 82.1 kg (181 lb)   Height: 187.3 cm (73.75\")     Wt Readings from Last 3 Encounters:   09/13/21 82.1 kg (181 lb)   05/13/21 83.9 kg (185 lb)   01/05/21 81.2 kg (179 lb)     Compliance Drug Analysis, Ur -  Order: [id number]  Status:  Final result   Visible to patient:  Yes (MyChart) Next appt:  " Today at 04:15 PM in Family Medicine (Queta N. Head, APRN)        1 Result Note  Component 4 mo ago   Report Summary FINAL    Comment: ====================================================================   [facility] COMP DRUG ANALYSIS,UR   ====================================================================   Test                             Result       Flag       Units   Drug Present     Amphetamine                    6073                    ng/mg creat      Amphetamine is available as a schedule II prescription drug.   ====================================================================   Test                      Result    Flag   Units      Ref Range     Creatinine              135              mg/dL      >=20   ====================================================================   Declared Medications:    Medication list was not provided.   ====================================================================   For clinical consultation, please call [phone].   ====================================================================    Resulting Agency [facility]      Narrative  Performed by: [facility]  Performed at:  [address] [facility] [address]  [id number]   : [facility], Phone:  [phone]      Specimen Collected: 05/13/21 16:14 Last Resulted: 05/19/21 14:09             Objective   General:  Alert, pleasant, no distress  HEENT:  Normocephalic and atraumatic  Neck:  No thyroid megaly nor lymphadenopathy  Lungs: Normal respiratory rate, Clear auscultation bilaterally.    Heart:: Regular rate, no murmur  Skin: No rash  Neuro:  Cranial nerves grossly normal. No tremor  Psych:  Mood stable, clear thought process    Assessment/Plan   Diagnoses and all orders for this visit:    1. ADHD (attention deficit hyperactivity disorder), inattentive type (Primary)   Well controlled on current medication regimen.  No changes.      The patient has read and signed the [facility] Controlled Substance Contract. I will continue to see patient for regular follow up appointments. They are well controlled on their medication.  SAEID was reviewed and is compliant and will be updated at least every 3 months. The patient is aware of the potential for addiction and dependence. Will provide another prescription for Adderall when needed.      There are no discontinued medications.     Patient Instructions   Attention Deficit Hyperactivity Disorder, Adult  Attention deficit hyperactivity disorder (ADHD) is a mental health disorder that starts during childhood (neurodevelopmental disorder). For many people with ADHD, the disorder continues into the adult years. Treatment can help you manage your symptoms.  What are the causes?  The exact cause of ADHD is not known. Most experts believe genetics and environmental factors contribute to ADHD.  What increases the risk?  The following factors may make you more likely to develop this condition:  · Having a family history of ADHD.  · Being male.  · Being born to a mother who smoked or drank alcohol during pregnancy.  · Being exposed to lead or other toxins in the womb or early in life.  · Being born before 37 weeks of pregnancy (prematurely) or at a low birth weight.  · Having experienced a brain injury.  What are the signs or symptoms?  Symptoms of this condition depend on the type of ADHD. The two main types are inattentive and hyperactive-impulsive. Some people may have symptoms of both types.  Symptoms of the inattentive type include:  · Difficulty paying attention.  · Making careless mistakes.  · Not following instructions.  · Being disorganized.  · Avoiding tasks that require time and attention.  · Losing and forgetting things.  · Being easily distracted.  Symptoms of the hyperactive-impulsive type include:  · Restlessness.  · Talking too much.  · Interrupting.  · Difficulty with:  ? Sitting still.  ? Feeling motivated.  ? Relaxing.  ? Waiting in  line or waiting for a turn.  In adults, this condition may lead to certain problems, such as:  · Keeping jobs.  · Performing tasks at work.  · Having stable relationships.  · Being on time or keeping to a schedule.  How is this diagnosed?  This condition is diagnosed based on your current symptoms and your history of symptoms. The diagnosis can be made by a health care provider such as a primary care provider or a mental health care specialist.  Your health care provider may use a symptom checklist or a behavior rating scale to evaluate your symptoms. He or she may also want to talk with people who have observed your behaviors throughout your life.  How is this treated?  This condition can be treated with medicines and behavior therapy. Medicines may be the best option to reduce impulsive behaviors and improve attention. Your health care provider may recommend:  · Stimulant medicines. These are the most common medicines used for adult ADHD. They affect certain chemicals in the brain (neurotransmitters) and improve your ability to control your symptoms.  · A non-stimulant medicine for adult ADHD (atomoxetine). This medicine increases a neurotransmitter called norepinephrine. It may take weeks to months to see effects from this medicine.  Counseling and behavioral management are also important for treating ADHD. Counseling is often used along with medicine. Your health care provider may suggest:  · Cognitive behavioral therapy (CBT). This type of therapy teaches you to replace negative thoughts and actions with positive thoughts and actions. When used as part of ADHD treatment, this therapy may also include:  ? Coping strategies for organization, time management, impulse control, and stress reduction.  ? Mindfulness and meditation training.  · Behavioral management. You may work with a  who is specially trained to help people with ADHD manage and organize activities and function more effectively.  Follow these  instructions at home:  Medicines    · Take over-the-counter and prescription medicines only as told by your health care provider.  · Talk with your health care provider about the possible side effects of your medicines and how to manage them.    Lifestyle    · Do not use drugs.  · Do not drink alcohol if:  ? Your health care provider tells you not to drink.  ? You are pregnant, may be pregnant, or are planning to become pregnant.  · If you drink alcohol:  ? Limit how much you use to:  § 0-1 drink a day for women.  § 0-2 drinks a day for men.  ? Be aware of how much alcohol is in your drink. In the [place] one drink equals one 12 oz bottle of beer (355 mL), one 5 oz glass of wine (148 mL), or one 1½ oz glass of hard liquor (44 mL).  · Get enough sleep.  · Eat a healthy diet.  · Exercise regularly. Exercise can help to reduce stress and anxiety.    General instructions  · Learn as much as you can about adult ADHD, and work closely with your health care providers to find the treatments that work best for you.  · Follow the same schedule each day.  · Use reminder devices like notes, calendars, and phone apps to stay on time and organized.  · Keep all follow-up visits as told by your health care provider and therapist. This is important.  Where to find more information  A health care provider may be able to recommend resources that are available online or over the phone. You could start with:  · Attention Deficit Disorder Association (ADDA): www.add.org  · National [place] of Mental Health (NIMH): www.nimh.nih.gov  Contact a health care provider if:  · Your symptoms continue to cause problems.  · You have side effects from your medicine, such as:  ? Repeated muscle twitches, coughing, or speech outbursts.  ? Sleep problems.  ? Loss of appetite.  ? Dizziness.  ? Unusually fast heartbeat.  ? Stomach pains.  ? Headaches.  · You are struggling with anxiety, depression, or substance abuse.  Get help right away if you:  · Have  a severe reaction to a medicine.  If you ever feel like you may hurt yourself or others, or have thoughts about taking your own life, get help right away. You can go to the nearest emergency department or call:  · Your local emergency services (911 in the [place]  · A suicide crisis helpline, such as the National Suicide Prevention Lifeline at 1-755.981.2813. This is open 24 hours a day.  Summary  · ADHD is a mental health disorder that starts during childhood (neurodevelopmental disorder) and often continues into the adult years.  · The exact cause of ADHD is not known. Most experts believe genetics and environmental factors contribute to ADHD.  · There is no cure for ADHD, but treatment with medicine, cognitive behavioral therapy, or behavioral management can help you manage your condition.  This information is not intended to replace advice given to you by your health care provider. Make sure you discuss any questions you have with your health care provider.  Document Revised: 05/11/2020 Document Reviewed: 05/11/2020  [employer] Patient Education © 2021 [employer]      Return in about 4 months (around 1/13/2022) for Recheck on ADHD - Video Visit.    Patient was wearing face mask when I entered the room and throughout our encounter. Protective equipment was worn throughout this patient encounter including a face mask, eye protection, and gloves. Hand hygiene was performed before donning protective equipment and after removal when leaving the room.       Queta Mason, SERAFIN

## 2021-09-21 DIAGNOSIS — F90.0 ADHD (ATTENTION DEFICIT HYPERACTIVITY DISORDER), INATTENTIVE TYPE: ICD-10-CM

## 2021-09-21 RX ORDER — DEXTROAMPHETAMINE SACCHARATE, AMPHETAMINE ASPARTATE, DEXTROAMPHETAMINE SULFATE AND AMPHETAMINE SULFATE 5; 5; 5; 5 MG/1; MG/1; MG/1; MG/1
20 TABLET ORAL 2 TIMES DAILY
Qty: 60 TABLET | Refills: 0 | Status: SHIPPED | OUTPATIENT
Start: 2021-09-21 | End: 2021-10-21 | Stop reason: SDUPTHER

## 2021-10-21 DIAGNOSIS — F90.0 ADHD (ATTENTION DEFICIT HYPERACTIVITY DISORDER), INATTENTIVE TYPE: ICD-10-CM

## 2021-10-21 RX ORDER — DEXTROAMPHETAMINE SACCHARATE, AMPHETAMINE ASPARTATE, DEXTROAMPHETAMINE SULFATE AND AMPHETAMINE SULFATE 5; 5; 5; 5 MG/1; MG/1; MG/1; MG/1
20 TABLET ORAL 2 TIMES DAILY
Qty: 60 TABLET | Refills: 0 | Status: SHIPPED | OUTPATIENT
Start: 2021-10-21 | End: 2021-11-23 | Stop reason: SDUPTHER

## 2021-11-23 DIAGNOSIS — E55.9 VITAMIN D INSUFFICIENCY: ICD-10-CM

## 2021-11-23 DIAGNOSIS — F90.0 ADHD (ATTENTION DEFICIT HYPERACTIVITY DISORDER), INATTENTIVE TYPE: ICD-10-CM

## 2021-11-23 RX ORDER — DEXTROAMPHETAMINE SACCHARATE, AMPHETAMINE ASPARTATE, DEXTROAMPHETAMINE SULFATE AND AMPHETAMINE SULFATE 5; 5; 5; 5 MG/1; MG/1; MG/1; MG/1
20 TABLET ORAL 2 TIMES DAILY
Qty: 60 TABLET | Refills: 0 | Status: SHIPPED | OUTPATIENT
Start: 2021-11-23 | End: 2021-12-23 | Stop reason: SDUPTHER

## 2021-11-23 RX ORDER — ERGOCALCIFEROL (VITAMIN D2) 10 MCG
400 TABLET ORAL DAILY
Qty: 90 TABLET | Refills: 1 | COMMUNITY
Start: 2021-11-23 | End: 2022-05-12

## 2021-12-23 DIAGNOSIS — F90.0 ADHD (ATTENTION DEFICIT HYPERACTIVITY DISORDER), INATTENTIVE TYPE: ICD-10-CM

## 2021-12-23 NOTE — TELEPHONE ENCOUNTER
Rx Refill Note  Requested Prescriptions     Pending Prescriptions Disp Refills   • amphetamine-dextroamphetamine (Adderall) 20 MG tablet 60 tablet 0     Sig: Take 1 tablet by mouth 2 (Two) Times a Day.      Last office visit with prescribing clinician: 9/13/2021      Next office visit with prescribing clinician: 1/10/2022     Last RF 11/23/2021 #60 No RF   Compliance Drug Analysis, Ur - (05/13/2021 16:14)      Norma Salcido LPN  12/23/21, 14:42 EST

## 2021-12-26 RX ORDER — DEXTROAMPHETAMINE SACCHARATE, AMPHETAMINE ASPARTATE, DEXTROAMPHETAMINE SULFATE AND AMPHETAMINE SULFATE 5; 5; 5; 5 MG/1; MG/1; MG/1; MG/1
20 TABLET ORAL 2 TIMES DAILY
Qty: 60 TABLET | Refills: 0 | Status: SHIPPED | OUTPATIENT
Start: 2021-12-26 | End: 2022-01-10 | Stop reason: DRUGHIGH

## 2022-01-10 ENCOUNTER — TELEMEDICINE (OUTPATIENT)
Dept: FAMILY MEDICINE CLINIC | Facility: CLINIC | Age: 37
End: 2022-01-10

## 2022-01-10 DIAGNOSIS — F90.0 ADHD (ATTENTION DEFICIT HYPERACTIVITY DISORDER), INATTENTIVE TYPE: ICD-10-CM

## 2022-01-10 PROCEDURE — 99421 OL DIG E/M SVC 5-10 MIN: CPT | Performed by: NURSE PRACTITIONER

## 2022-01-10 RX ORDER — DEXTROAMPHETAMINE SACCHARATE, AMPHETAMINE ASPARTATE, DEXTROAMPHETAMINE SULFATE AND AMPHETAMINE SULFATE 5; 5; 5; 5 MG/1; MG/1; MG/1; MG/1
20 TABLET ORAL 3 TIMES DAILY
Qty: 90 TABLET | Refills: 0
Start: 2022-01-10 | End: 2022-01-17 | Stop reason: SDUPTHER

## 2022-01-10 NOTE — PROGRESS NOTES
Chief Complaint   Patient presents with   • ADD     Bora Siddiqui is a 36 y.o. who presents today for an E-visit with complaints of ADHD    You have chosen to receive care through a telehealth visit.  Do you consent to use a video/audio connection for your medical care today? Yes  Patient was unable to get video connection to work.  Visit completed using audio.      Bora Siddiqui was located at their residence.  SERAFIN Segovia was located at [facility] office    Participants:  Patient and provider      Time spent caring for the patient was 5 - 10 min.  Subjective   Bora Siddiqui 36 y.o. male who presents for follow up of for  ADD/ADHD. Patient previously was well controlled on their current Rx which is Adderall 20 mg bid.  He has been on current dose since August 2020.  However over the past 2-3 months, he does not feel medication is working as well as previously.  Seems to wear off to soon.  Usually takes first dose around 0500 and second dose at 1100.  Goes to bed around 2100.  Finds having problems staying on task in the afternoon.    No new problems with: insomnia, tachycardia, anxiety, elevated blood pressure or weight loss.     I will continue to see patient for regular follow up appointments.    The medication continues to help with: concentration, finishing tasks in timely manor, and succeeding at school and or at work however not as well as previously.    Adderall 20 mg twice a day.  Last filled on 12/26/2021.        History of Present Illness     The following portions of the patient's history were reviewed and updated as appropriate: allergies, current medications, past family history, past medical history, past social history, past surgical history and problem list.    Review of Systems    There were no vitals filed for this visit.  Wt Readings from Last 3 Encounters:   09/13/21 82.1 kg (181 lb)   05/13/21 83.9 kg (185 lb)   01/05/21 81.2 kg (179 lb)     Objective   General:  Alert, pleasant, no  distress  Psych:  Mood stable, clear thought process    Compliance Drug Analysis, Ur - (05/13/2021 16:14)     Assessment/Plan   Diagnoses and all orders for this visit:    1. ADHD (attention deficit hyperactivity disorder), inattentive type  -     amphetamine-dextroamphetamine (Adderall) 20 MG tablet; Take 1 tablet by mouth 3 (Three) Times a Day.  Dispense: 90 tablet; Refill: 0          The patient has read and signed the [facility] Controlled Substance Contract. I will continue to see patient for regular follow up appointments. They are well controlled on their medication.  SAEID was reviewed and is compliant and will be updated at least every 3 months. The patient is aware of the potential for addiction and dependence. Will provide another prescription for Adderall when needed.  He will need refill sooner due to dose adjustment from twice a day to three times a day dosing.     Will send [employer] message in about 4 weeks to see how he is doing.        Medications Discontinued During This Encounter   Medication Reason   • amphetamine-dextroamphetamine (Adderall) 20 MG tablet Dose adjustment        There are no Patient Instructions on file for this visit.  No follow-ups on file.      Queta Mason, APRN

## 2022-01-10 NOTE — PATIENT INSTRUCTIONS
Attention Deficit Hyperactivity Disorder, Adult  Attention deficit hyperactivity disorder (ADHD) is a mental health disorder that starts during childhood (neurodevelopmental disorder). For many people with ADHD, the disorder continues into the adult years. Treatment can help you manage your symptoms.  What are the causes?  The exact cause of ADHD is not known. Most experts believe genetics and environmental factors contribute to ADHD.  What increases the risk?  The following factors may make you more likely to develop this condition:  · Having a family history of ADHD.  · Being male.  · Being born to a mother who smoked or drank alcohol during pregnancy.  · Being exposed to lead or other toxins in the womb or early in life.  · Being born before 37 weeks of pregnancy (prematurely) or at a low birth weight.  · Having experienced a brain injury.  What are the signs or symptoms?  Symptoms of this condition depend on the type of ADHD. The two main types are inattentive and hyperactive-impulsive. Some people may have symptoms of both types.  Symptoms of the inattentive type include:  · Difficulty paying attention.  · Making careless mistakes.  · Not following instructions.  · Being disorganized.  · Avoiding tasks that require time and attention.  · Losing and forgetting things.  · Being easily distracted.  Symptoms of the hyperactive-impulsive type include:  · Restlessness.  · Talking too much.  · Interrupting.  · Difficulty with:  ? Sitting still.  ? Feeling motivated.  ? Relaxing.  ? Waiting in line or waiting for a turn.  In adults, this condition may lead to certain problems, such as:  · Keeping jobs.  · Performing tasks at work.  · Having stable relationships.  · Being on time or keeping to a schedule.  How is this diagnosed?  This condition is diagnosed based on your current symptoms and your history of symptoms. The diagnosis can be made by a health care provider such as a primary care provider or a mental health  care specialist.  Your health care provider may use a symptom checklist or a behavior rating scale to evaluate your symptoms. He or she may also want to talk with people who have observed your behaviors throughout your life.  How is this treated?  This condition can be treated with medicines and behavior therapy. Medicines may be the best option to reduce impulsive behaviors and improve attention. Your health care provider may recommend:  · Stimulant medicines. These are the most common medicines used for adult ADHD. They affect certain chemicals in the brain (neurotransmitters) and improve your ability to control your symptoms.  · A non-stimulant medicine for adult ADHD (atomoxetine). This medicine increases a neurotransmitter called norepinephrine. It may take weeks to months to see effects from this medicine.  Counseling and behavioral management are also important for treating ADHD. Counseling is often used along with medicine. Your health care provider may suggest:  · Cognitive behavioral therapy (CBT). This type of therapy teaches you to replace negative thoughts and actions with positive thoughts and actions. When used as part of ADHD treatment, this therapy may also include:  ? Coping strategies for organization, time management, impulse control, and stress reduction.  ? Mindfulness and meditation training.  · Behavioral management. You may work with a  who is specially trained to help people with ADHD manage and organize activities and function more effectively.  Follow these instructions at home:  Medicines    · Take over-the-counter and prescription medicines only as told by your health care provider.  · Talk with your health care provider about the possible side effects of your medicines and how to manage them.    Lifestyle    · Do not use drugs.  · Do not drink alcohol if:  ? Your health care provider tells you not to drink.  ? You are pregnant, may be pregnant, or are planning to become  pregnant.  · If you drink alcohol:  ? Limit how much you use to:  § 0-1 drink a day for women.  § 0-2 drinks a day for men.  ? Be aware of how much alcohol is in your drink. In the [place] one drink equals one 12 oz bottle of beer (355 mL), one 5 oz glass of wine (148 mL), or one 1½ oz glass of hard liquor (44 mL).  · Get enough sleep.  · Eat a healthy diet.  · Exercise regularly. Exercise can help to reduce stress and anxiety.    General instructions  · Learn as much as you can about adult ADHD, and work closely with your health care providers to find the treatments that work best for you.  · Follow the same schedule each day.  · Use reminder devices like notes, calendars, and phone apps to stay on time and organized.  · Keep all follow-up visits as told by your health care provider and therapist. This is important.  Where to find more information  A health care provider may be able to recommend resources that are available online or over the phone. You could start with:  · Attention Deficit Disorder Association (ADDA): www.add.org  · National [place] of Mental Health (NIM): www.nimh.nih.gov  Contact a health care provider if:  · Your symptoms continue to cause problems.  · You have side effects from your medicine, such as:  ? Repeated muscle twitches, coughing, or speech outbursts.  ? Sleep problems.  ? Loss of appetite.  ? Dizziness.  ? Unusually fast heartbeat.  ? Stomach pains.  ? Headaches.  · You are struggling with anxiety, depression, or substance abuse.  Get help right away if you:  · Have a severe reaction to a medicine.  If you ever feel like you may hurt yourself or others, or have thoughts about taking your own life, get help right away. You can go to the nearest emergency department or call:  · Your local emergency services (911 in the [place]  · A suicide crisis helpline, such as the National Suicide Prevention Lifeline at 1-321.813.5953. This is open 24 hours a day.  Summary  · ADHD is a mental  health disorder that starts during childhood (neurodevelopmental disorder) and often continues into the adult years.  · The exact cause of ADHD is not known. Most experts believe genetics and environmental factors contribute to ADHD.  · There is no cure for ADHD, but treatment with medicine, cognitive behavioral therapy, or behavioral management can help you manage your condition.  This information is not intended to replace advice given to you by your health care provider. Make sure you discuss any questions you have with your health care provider.  Document Revised: 05/11/2020 Document Reviewed: 05/11/2020  [facility] Patient Education © 2021 [employer]

## 2022-01-17 DIAGNOSIS — F90.0 ADHD (ATTENTION DEFICIT HYPERACTIVITY DISORDER), INATTENTIVE TYPE: ICD-10-CM

## 2022-01-18 RX ORDER — DEXTROAMPHETAMINE SACCHARATE, AMPHETAMINE ASPARTATE, DEXTROAMPHETAMINE SULFATE AND AMPHETAMINE SULFATE 5; 5; 5; 5 MG/1; MG/1; MG/1; MG/1
20 TABLET ORAL 3 TIMES DAILY
Qty: 90 TABLET | Refills: 0 | Status: SHIPPED | OUTPATIENT
Start: 2022-01-18 | End: 2022-02-27 | Stop reason: SDUPTHER

## 2022-01-18 NOTE — TELEPHONE ENCOUNTER
GINO  1/10/2022  LF  12/26/2021  DIRECTIONS WERE CHANGED FROM 2 TIMES DAILY TO 3 TIMES DAILY AT [facility] 10 OV.  THAT IS WHY HE RAN OUT EARLY.    SAEID/TIFFANIE

## 2022-02-21 DIAGNOSIS — F90.0 ADHD (ATTENTION DEFICIT HYPERACTIVITY DISORDER), INATTENTIVE TYPE: ICD-10-CM

## 2022-02-22 RX ORDER — DEXTROAMPHETAMINE SACCHARATE, AMPHETAMINE ASPARTATE, DEXTROAMPHETAMINE SULFATE AND AMPHETAMINE SULFATE 5; 5; 5; 5 MG/1; MG/1; MG/1; MG/1
20 TABLET ORAL 3 TIMES DAILY
Qty: 90 TABLET | Refills: 0 | OUTPATIENT
Start: 2022-02-22

## 2022-02-27 DIAGNOSIS — F90.0 ADHD (ATTENTION DEFICIT HYPERACTIVITY DISORDER), INATTENTIVE TYPE: ICD-10-CM

## 2022-02-28 RX ORDER — DEXTROAMPHETAMINE SACCHARATE, AMPHETAMINE ASPARTATE, DEXTROAMPHETAMINE SULFATE AND AMPHETAMINE SULFATE 5; 5; 5; 5 MG/1; MG/1; MG/1; MG/1
20 TABLET ORAL 3 TIMES DAILY
Qty: 90 TABLET | Refills: 0 | Status: SHIPPED | OUTPATIENT
Start: 2022-02-28 | End: 2022-03-25 | Stop reason: SDUPTHER

## 2022-03-25 DIAGNOSIS — F90.0 ADHD (ATTENTION DEFICIT HYPERACTIVITY DISORDER), INATTENTIVE TYPE: ICD-10-CM

## 2022-03-25 RX ORDER — DEXTROAMPHETAMINE SACCHARATE, AMPHETAMINE ASPARTATE, DEXTROAMPHETAMINE SULFATE AND AMPHETAMINE SULFATE 5; 5; 5; 5 MG/1; MG/1; MG/1; MG/1
20 TABLET ORAL 3 TIMES DAILY
Qty: 90 TABLET | Refills: 0 | Status: SHIPPED | OUTPATIENT
Start: 2022-03-25 | End: 2022-05-12 | Stop reason: SDUPTHER

## 2022-05-02 DIAGNOSIS — F90.0 ADHD (ATTENTION DEFICIT HYPERACTIVITY DISORDER), INATTENTIVE TYPE: ICD-10-CM

## 2022-05-02 RX ORDER — DEXTROAMPHETAMINE SACCHARATE, AMPHETAMINE ASPARTATE, DEXTROAMPHETAMINE SULFATE AND AMPHETAMINE SULFATE 5; 5; 5; 5 MG/1; MG/1; MG/1; MG/1
20 TABLET ORAL 3 TIMES DAILY
Qty: 90 TABLET | Refills: 0 | OUTPATIENT
Start: 2022-05-02

## 2022-05-12 ENCOUNTER — OFFICE VISIT (OUTPATIENT)
Dept: FAMILY MEDICINE CLINIC | Facility: CLINIC | Age: 37
End: 2022-05-12

## 2022-05-12 VITALS
HEIGHT: 74 IN | BODY MASS INDEX: 23.1 KG/M2 | SYSTOLIC BLOOD PRESSURE: 112 MMHG | HEART RATE: 72 BPM | DIASTOLIC BLOOD PRESSURE: 70 MMHG | TEMPERATURE: 98.4 F | WEIGHT: 180 LBS | RESPIRATION RATE: 16 BRPM | OXYGEN SATURATION: 99 %

## 2022-05-12 DIAGNOSIS — F90.0 ADHD (ATTENTION DEFICIT HYPERACTIVITY DISORDER), INATTENTIVE TYPE: Primary | ICD-10-CM

## 2022-05-12 DIAGNOSIS — F90.0 ADHD (ATTENTION DEFICIT HYPERACTIVITY DISORDER), INATTENTIVE TYPE: ICD-10-CM

## 2022-05-12 PROCEDURE — 99213 OFFICE O/P EST LOW 20 MIN: CPT | Performed by: NURSE PRACTITIONER

## 2022-05-12 RX ORDER — DEXTROAMPHETAMINE SACCHARATE, AMPHETAMINE ASPARTATE, DEXTROAMPHETAMINE SULFATE AND AMPHETAMINE SULFATE 5; 5; 5; 5 MG/1; MG/1; MG/1; MG/1
20 TABLET ORAL 3 TIMES DAILY
Qty: 90 TABLET | Refills: 0 | Status: SHIPPED | OUTPATIENT
Start: 2022-05-12 | End: 2022-06-08 | Stop reason: SDUPTHER

## 2022-05-12 RX ORDER — DIPHENOXYLATE HYDROCHLORIDE AND ATROPINE SULFATE 2.5; .025 MG/1; MG/1
TABLET ORAL DAILY
COMMUNITY

## 2022-05-12 NOTE — PROGRESS NOTES
"Answers for HPI/ROS submitted by the patient on 5/12/2022  What is the primary reason for your visit?: Other  Please describe your symptoms.: Medication. aDHD  Have you had these symptoms before?: Yes  How long have you been having these symptoms?: Greater than 2 weeks  Please list any medications you are currently taking for this condition.: Aderall  Please describe any probable cause for these symptoms. : 4 children and a wife.      Chief Complaint   Patient presents with   • ADHD       Subjective   Bora Siddiqui 36 y.o. male who presents for follow up of for  ADD/ADHD. Patient is well controlled on their current Rx.  Doing well since increasing from twice a day to three times per day.    No new problems with: insomnia, tachycardia, anxiety, elevated blood pressure or weight loss.     I will continue to see patient for regular follow up appointments.    The medication continues to help with: concentration, finishing tasks in timely manor, and succeeding at school and or at work.    Has been out for the past few days and can tell a difference.         History of Present Illness     The following portions of the patient's history were reviewed and updated as appropriate: allergies, current medications, past family history, past medical history, past social history, past surgical history and problem list.    Review of Systems    Vitals:    05/12/22 0828   BP: 112/70   BP Location: Left arm   Patient Position: Sitting   Cuff Size: Adult   Pulse: 72   Resp: 16   Temp: 98.4 °F (36.9 °C)   SpO2: 99%   Weight: 81.6 kg (180 lb)   Height: 187.3 cm (73.75\")     Wt Readings from Last 3 Encounters:   05/12/22 81.6 kg (180 lb)   09/13/21 82.1 kg (181 lb)   05/13/21 83.9 kg (185 lb)     Objective   General:  Alert, pleasant, no distress  HEENT:  Normocephalic and atraumatic  Neck:  No thyroid megaly nor lymphadenopathy  Lungs: Normal respiratory rate, Clear auscultation bilaterally.    Heart:: Regular rate, no murmur  Skin: No " rash  Neuro:  Cranial nerves grossly normal. No tremor  Psych:  Mood stable, clear thought process    Assessment & Plan   Diagnoses and all orders for this visit:    1. ADHD (attention deficit hyperactivity disorder), inattentive type (Primary)          The patient has read and signed the [facility] Controlled Substance Contract. I will continue to see patient for regular follow up appointments. They are well controlled on their medication.  SAEID was reviewed and is compliant and will be updated at least every 3 months. The patient is aware of the potential for addiction and dependence. I have provided another prescription for Adderall.      Medications Discontinued During This Encounter   Medication Reason   • Vitamin D, Cholecalciferol, (CHOLECALCIFEROL) 10 MCG (400 UNIT) tablet *Therapy completed        There are no Patient Instructions on file for this visit.  Return in about 4 months (around 9/12/2022) for Annual with fasting labs the week before.  .      Queta Mason, APRN

## 2022-06-08 DIAGNOSIS — F90.0 ADHD (ATTENTION DEFICIT HYPERACTIVITY DISORDER), INATTENTIVE TYPE: ICD-10-CM

## 2022-06-08 RX ORDER — DEXTROAMPHETAMINE SACCHARATE, AMPHETAMINE ASPARTATE, DEXTROAMPHETAMINE SULFATE AND AMPHETAMINE SULFATE 5; 5; 5; 5 MG/1; MG/1; MG/1; MG/1
20 TABLET ORAL 3 TIMES DAILY
Qty: 90 TABLET | Refills: 0 | Status: SHIPPED | OUTPATIENT
Start: 2022-06-08 | End: 2022-07-08 | Stop reason: SDUPTHER

## 2022-07-08 DIAGNOSIS — F90.0 ADHD (ATTENTION DEFICIT HYPERACTIVITY DISORDER), INATTENTIVE TYPE: ICD-10-CM

## 2022-07-08 RX ORDER — DEXTROAMPHETAMINE SACCHARATE, AMPHETAMINE ASPARTATE, DEXTROAMPHETAMINE SULFATE AND AMPHETAMINE SULFATE 5; 5; 5; 5 MG/1; MG/1; MG/1; MG/1
20 TABLET ORAL 3 TIMES DAILY
Qty: 90 TABLET | Refills: 0 | Status: SHIPPED | OUTPATIENT
Start: 2022-07-08 | End: 2022-08-08 | Stop reason: SDUPTHER

## 2022-08-08 DIAGNOSIS — F90.0 ADHD (ATTENTION DEFICIT HYPERACTIVITY DISORDER), INATTENTIVE TYPE: ICD-10-CM

## 2022-08-09 RX ORDER — DEXTROAMPHETAMINE SACCHARATE, AMPHETAMINE ASPARTATE, DEXTROAMPHETAMINE SULFATE AND AMPHETAMINE SULFATE 5; 5; 5; 5 MG/1; MG/1; MG/1; MG/1
20 TABLET ORAL 3 TIMES DAILY
Qty: 90 TABLET | Refills: 0 | Status: SHIPPED | OUTPATIENT
Start: 2022-08-09 | End: 2022-09-11 | Stop reason: SDUPTHER

## 2022-09-11 DIAGNOSIS — F90.0 ADHD (ATTENTION DEFICIT HYPERACTIVITY DISORDER), INATTENTIVE TYPE: ICD-10-CM

## 2022-09-12 RX ORDER — DEXTROAMPHETAMINE SACCHARATE, AMPHETAMINE ASPARTATE, DEXTROAMPHETAMINE SULFATE AND AMPHETAMINE SULFATE 5; 5; 5; 5 MG/1; MG/1; MG/1; MG/1
20 TABLET ORAL 3 TIMES DAILY
Qty: 90 TABLET | Refills: 0 | Status: SHIPPED | OUTPATIENT
Start: 2022-09-12 | End: 2022-10-12 | Stop reason: SDUPTHER

## 2022-10-10 ENCOUNTER — OFFICE VISIT (OUTPATIENT)
Dept: FAMILY MEDICINE CLINIC | Facility: CLINIC | Age: 37
End: 2022-10-10

## 2022-10-10 VITALS
SYSTOLIC BLOOD PRESSURE: 110 MMHG | TEMPERATURE: 98.4 F | DIASTOLIC BLOOD PRESSURE: 60 MMHG | HEART RATE: 92 BPM | HEIGHT: 74 IN | RESPIRATION RATE: 16 BRPM | BODY MASS INDEX: 24 KG/M2 | WEIGHT: 187 LBS | OXYGEN SATURATION: 100 %

## 2022-10-10 DIAGNOSIS — Z23 FLU VACCINE NEED: ICD-10-CM

## 2022-10-10 DIAGNOSIS — F90.0 ADHD (ATTENTION DEFICIT HYPERACTIVITY DISORDER), INATTENTIVE TYPE: ICD-10-CM

## 2022-10-10 DIAGNOSIS — Z79.899 HIGH RISK MEDICATION USE: ICD-10-CM

## 2022-10-10 DIAGNOSIS — Z00.00 ANNUAL PHYSICAL EXAM: Primary | ICD-10-CM

## 2022-10-10 PROCEDURE — 99395 PREV VISIT EST AGE 18-39: CPT | Performed by: NURSE PRACTITIONER

## 2022-10-10 PROCEDURE — 90686 IIV4 VACC NO PRSV 0.5 ML IM: CPT | Performed by: NURSE PRACTITIONER

## 2022-10-10 PROCEDURE — 90471 IMMUNIZATION ADMIN: CPT | Performed by: NURSE PRACTITIONER

## 2022-10-10 NOTE — PROGRESS NOTES
"Chief Complaint   Patient presents with   • Annual Exam       Patient Care Team:  Head, SERAFIN Mahan as PCP - General (Nurse Practitioner)    Subjective   Bora Siddiqui is a 36 y.o. male and is here for a yearly physical exam. The patient reports no problems.    ADHD:  Well controlled with Adderall 20 mg three times per day.  He is a manager for [employer].  Medication keeps him on task and able to complete in a timely manner.  No adverse effects such as insomnia, tachycardia, chest pain, or any other symptoms.      Do you take any herbs or supplements that were not prescribed by a doctor? no. If so, these will be added to active medication list.      Health Habits:  Dental Exam: Up to date  Eye Exam: Up to date  Diet: Limits meat.  Eats plenty of vegetables.    Exercise: Daily   Current exercise activities include: Walking.      The following portions of the patient's history were reviewed and updated as appropriate: allergies, current medications, past family history, past medical history, past social history, past surgical history and problem list.    Social and Family and Surgical History reviewed and updated today, see Rooming tab.    Health History, Preventive Measures and Vaccination flow sheets reviewed and updated today.    Patient's current medical chart in Epic; including previous office notes, imaging, labs, specialist's evaluation either in notes or in Media tab reviewed today.    Other pertinent medical information also reviewed thru Care Everywhere function is also reviewed today.    Review of Systems  Review of Systems  Vitals:    10/10/22 1537   BP: 110/60   BP Location: Left arm   Patient Position: Sitting   Cuff Size: Adult   Pulse: 92   Resp: 16   Temp: 98.4 °F (36.9 °C)   SpO2: 100%   Weight: 84.8 kg (187 lb)   Height: 187.3 cm (73.75\")     Wt Readings from Last 3 Encounters:   10/10/22 84.8 kg (187 lb)   05/12/22 81.6 kg (180 lb)   09/13/21 82.1 kg (181 lb)       General Appearance:  Alert, " cooperative, no distress, appears stated age   Head:  Normocephalic, without obvious abnormality, atraumatic   Eyes:  PERRL, conjunctiva/corneas clear, EOM's intact.   Ears:  Normal TM's and external ear canals, both ears   Nose: Nares normal, septum midline, mucosa normal, no drainage or sinus tenderness   Throat: Lips, mucosa, and tongue normal; teeth and gums normal   Neck: Supple, symmetrical, trachea midline, no adenopathy;   thyroid: No enlargement/tenderness/nodules       Lungs:  Clear to auscultation bilaterally, respirations even and unlabored   Chest wall:  No tenderness or deformity   Heart:  Regular rate and rhythm, S1 and S2 normal, no murmur, rub or gallop   Abdomen:  Soft, non-tender, bowel sounds active all four quadrants,   no masses, no organomegaly           Extremities: Extremities normal, atraumatic, no cyanosis or edema   Pulses: 2+ and symmetric all extremities   Skin: Skin color, texture, turgor normal, no rashes or lesions   Lymph nodes: Cervical, supraclavicular, and axillary nodes normal   Neurologic: CNII-XII intact. Normal strength, sensation and reflexes   throughout       Results for orders placed or performed in visit on 05/13/22   Comprehensive Metabolic Panel    Specimen: Blood   Result Value Ref Range    Glucose 86 65 - 99 mg/dL    BUN 15 6 - 20 mg/dL    Creatinine 0.95 0.76 - 1.27 mg/dL    EGFR Result 106 >59 mL/min/1.73    BUN/Creatinine Ratio 16 9 - 20    Sodium 137 134 - 144 mmol/L    Potassium 4.8 3.5 - 5.2 mmol/L    Chloride 98 96 - 106 mmol/L    Total CO2 25 20 - 29 mmol/L    Calcium 9.9 8.7 - 10.2 mg/dL    Total Protein 7.4 6.0 - 8.5 g/dL    Albumin 4.9 4.0 - 5.0 g/dL    Globulin 2.5 1.5 - 4.5 g/dL    A/G Ratio 2.0 1.2 - 2.2    Total Bilirubin 2.0 (H) 0.0 - 1.2 mg/dL    Alkaline Phosphatase 67 44 - 121 IU/L    AST (SGOT) 26 0 - 40 IU/L    ALT (SGPT) 27 0 - 44 IU/L   Lipid Panel    Specimen: Blood   Result Value Ref Range    Total Cholesterol 133 100 - 199 mg/dL     Triglycerides 72 0 - 149 mg/dL    HDL Cholesterol 52 >39 mg/dL    VLDL Cholesterol Shakeel 15 5 - 40 mg/dL    LDL Chol Calc (NIH) 66 0 - 99 mg/dL   TSH    Specimen: Blood   Result Value Ref Range    TSH 0.875 0.450 - 4.500 uIU/mL   UA / M With / Rflx Culture([facility] ONLY) - Urine, Clean Catch    Specimen: Urine, Clean Catch   Result Value Ref Range    Specific Gravity, UA 1.018 1.005 - 1.030    pH, UA 7.0 5.0 - 7.5    Color, UA Yellow Yellow    Appearance, UA Clear Clear    Leukocytes, UA Negative Negative    Protein Negative Negative/Trace    Glucose, UA Negative Negative    Ketones Negative Negative    Blood, UA Negative Negative    Bilirubin, UA Negative Negative    Urobilinogen, UA 0.2 0.2 - 1.0 mg/dL    Nitrite, UA Negative Negative    Microscopic Examination Comment     Microscopic Examination See below:     Urinalysis Reflex Comment    Vitamin D 25 Hydroxy    Specimen: Blood   Result Value Ref Range    25 Hydroxy, Vitamin D 31.1 30.0 - 100.0 ng/mL   Compliance Drug Analysis, Ur - Urine, Clean Catch    Specimen: Urine, Clean Catch   Result Value Ref Range    Report Summary FINAL    Microscopic Examination -   Result Value Ref Range    WBC, UA None seen 0 - 5 /hpf    RBC, UA None seen 0 - 2 /hpf    Epithelial Cells (non renal) None seen 0 - 10 /hpf    Casts None seen None seen /lpf    Bacteria, UA None seen None seen/Few   CBC & Differential    Specimen: Blood   Result Value Ref Range    WBC 6.1 3.4 - 10.8 x10E3/uL    RBC 5.33 4.14 - 5.80 x10E6/uL    Hemoglobin 15.8 13.0 - 17.7 g/dL    Hematocrit 46.1 37.5 - 51.0 %    MCV 87 79 - 97 fL    MCH 29.6 26.6 - 33.0 pg    MCHC 34.3 31.5 - 35.7 g/dL    RDW 12.4 11.6 - 15.4 %    Platelets 309 150 - 450 x10E3/uL    Neutrophil Rel % 35 Not Estab. %    Lymphocyte Rel % 44 Not Estab. %    Monocyte Rel % 8 Not Estab. %    Eosinophil Rel % 12 Not Estab. %    Basophil Rel % 1 Not Estab. %    Neutrophils Absolute 2.1 1.4 - 7.0 x10E3/uL    Lymphocytes Absolute 2.7 0.7 - 3.1 x10E3/uL     Monocytes Absolute 0.5 0.1 - 0.9 x10E3/uL    Eosinophils Absolute 0.7 (H) 0.0 - 0.4 x10E3/uL    Basophils Absolute 0.1 0.0 - 0.2 x10E3/uL    Immature Granulocyte Rel % 0 Not Estab. %    Immature Grans Absolute 0.0 0.0 - 0.1 x10E3/uL     Assessment & Plan   Healthy male exam.  Diagnoses and all orders for this visit:    1. Annual physical exam (Primary)  -     CBC (No Diff); Future  -     Comprehensive Metabolic Panel; Future  -     Lipid Panel With / Chol / HDL Ratio; Future  -     TSH Rfx On Abnormal To Free T4; Future  -     UA / M With / Rflx Culture([facility] ONLY) - Urine, Clean Catch; Future    2. Flu vaccine need  -     FluLaval/Fluzone >6 mos (9738-2024)    3. ADHD (attention deficit hyperactivity disorder), inattentive type    4. High risk medication use  -     Compliance Drug Analysis, Ur - Urine, Clean Catch; Future      1. Bora Siddiqui has been doing well since he was last seen.  Reviewed recent labs along with patient which all are stable.  He is well controlled on his current medication for ADHD, Adderall 20 mg 3 times a day.  The patient has read and signed the [facility] Controlled Substance Contract. I will continue to see patient for regular follow up appointments. They are well controlled on their medication.  SAEID was reviewed and is compliant and will be updated at least every 3 months. The patient is aware of the potential for addiction and dependence. I have provided another prescription for Adderall.  We will have him return in 1 year for next annual physical with fasting labs.  He can complete his 4-month medication recheck appointment by video visit for his ADHD.  In the meantime, instructed contact us sooner for any problems or concerns.    2. Patient Counseling:  --Nutrition: Stressed importance of moderation in sodium/caffeine intake, saturated fat and cholesterol.  Discussed caloric balance, sufficient intake of fresh fruits, vegetables, fiber,    calcium, iron.  --Exercise:  Stressed the importance of regular exercise.   --Substance Abuse: Discussed cessation/primary prevention of tobacco, alcohol, or other drug use; driving or other dangerous activities under the influence.    --Dental health: Discussed importance of regular tooth brushing, flossing, and dental visits.  --Suggested having eyes and vision checked if needed or past due.  --Immunizations reviewed.  3. Discussed the patient's BMI with him.  The BMI is in the acceptable range  4. Follow up next physical in 1 year    Patient was given instructions and counseling regarding condition or for health maintenance advice.  Please see specific information pulled into the AVS if appropriate.      There are no discontinued medications.       Patient was wearing facemask when I entered the room and throughout our encounter. Protective equipment was worn by me throughout this patient encounter including a face mask.  Hand hygiene was performed before donning protective equipment and after removal when leaving the room.     SERAFIN Vicente  Family Practice  [facility] [place]

## 2022-10-12 DIAGNOSIS — F90.0 ADHD (ATTENTION DEFICIT HYPERACTIVITY DISORDER), INATTENTIVE TYPE: ICD-10-CM

## 2022-10-12 DIAGNOSIS — J45.20 MILD INTERMITTENT ASTHMA WITHOUT COMPLICATION: ICD-10-CM

## 2022-10-12 RX ORDER — ALBUTEROL SULFATE 90 UG/1
2 AEROSOL, METERED RESPIRATORY (INHALATION) EVERY 4 HOURS PRN
Qty: 18 G | Refills: 0 | Status: SHIPPED | OUTPATIENT
Start: 2022-10-12

## 2022-10-13 RX ORDER — DEXTROAMPHETAMINE SACCHARATE, AMPHETAMINE ASPARTATE, DEXTROAMPHETAMINE SULFATE AND AMPHETAMINE SULFATE 5; 5; 5; 5 MG/1; MG/1; MG/1; MG/1
20 TABLET ORAL 3 TIMES DAILY
Qty: 90 TABLET | Refills: 0 | Status: SHIPPED | OUTPATIENT
Start: 2022-10-13 | End: 2022-11-12 | Stop reason: SDUPTHER

## 2022-11-12 DIAGNOSIS — F90.0 ADHD (ATTENTION DEFICIT HYPERACTIVITY DISORDER), INATTENTIVE TYPE: ICD-10-CM

## 2022-11-14 RX ORDER — DEXTROAMPHETAMINE SACCHARATE, AMPHETAMINE ASPARTATE, DEXTROAMPHETAMINE SULFATE AND AMPHETAMINE SULFATE 5; 5; 5; 5 MG/1; MG/1; MG/1; MG/1
20 TABLET ORAL 3 TIMES DAILY
Qty: 90 TABLET | Refills: 0 | Status: SHIPPED | OUTPATIENT
Start: 2022-11-14 | End: 2022-12-15 | Stop reason: SDUPTHER

## 2022-12-15 DIAGNOSIS — F90.0 ADHD (ATTENTION DEFICIT HYPERACTIVITY DISORDER), INATTENTIVE TYPE: ICD-10-CM

## 2022-12-16 RX ORDER — DEXTROAMPHETAMINE SACCHARATE, AMPHETAMINE ASPARTATE, DEXTROAMPHETAMINE SULFATE AND AMPHETAMINE SULFATE 5; 5; 5; 5 MG/1; MG/1; MG/1; MG/1
20 TABLET ORAL 3 TIMES DAILY
Qty: 90 TABLET | Refills: 0 | Status: SHIPPED | OUTPATIENT
Start: 2022-12-16 | End: 2023-01-13 | Stop reason: SDUPTHER

## 2023-01-13 DIAGNOSIS — F90.0 ADHD (ATTENTION DEFICIT HYPERACTIVITY DISORDER), INATTENTIVE TYPE: ICD-10-CM

## 2023-01-16 RX ORDER — DEXTROAMPHETAMINE SACCHARATE, AMPHETAMINE ASPARTATE, DEXTROAMPHETAMINE SULFATE AND AMPHETAMINE SULFATE 5; 5; 5; 5 MG/1; MG/1; MG/1; MG/1
20 TABLET ORAL 3 TIMES DAILY
Qty: 90 TABLET | Refills: 0 | Status: SHIPPED | OUTPATIENT
Start: 2023-01-16 | End: 2023-02-17 | Stop reason: SDUPTHER

## 2023-02-13 ENCOUNTER — TELEMEDICINE (OUTPATIENT)
Dept: FAMILY MEDICINE CLINIC | Facility: CLINIC | Age: 38
End: 2023-02-13
Payer: COMMERCIAL

## 2023-02-13 DIAGNOSIS — F90.0 ADHD (ATTENTION DEFICIT HYPERACTIVITY DISORDER), INATTENTIVE TYPE: Primary | ICD-10-CM

## 2023-02-13 PROCEDURE — 99213 OFFICE O/P EST LOW 20 MIN: CPT | Performed by: NURSE PRACTITIONER

## 2023-02-13 NOTE — PROGRESS NOTES
Bora Siddiqui is a 37 y.o. who presents today for an E-visit for continued management concerning ADHD.      You have chosen to receive care through a telehealth visit.  Do you consent to use a video/audio connection for your medical care today? Yes    Bora Siddiqui was located at their residence.  SERAFIN Segovia was located at [facility] office    Participants:  Patient and provider    Start time:  1527   End time:  1532  Time spent caring for the patient was 5 - 10 min.      Chief Complaint   Patient presents with   • ADD       Subjective   Bora Siddiqui 37 y.o. male who presents for follow up of for  ADD/ADHD. Patient is well controlled on their current Rx.  Adderall 20 mg three times per day.  Last filled on 1/20/2023.     No new problems with: insomnia, tachycardia, anxiety, elevated blood pressure or weight loss.     I will continue to see patient for regular follow up appointments.    The medication continues to help with: concentration, finishing tasks in timely manor, and succeeding at school and or at work.           History of Present Illness     The following portions of the patient's history were reviewed and updated as appropriate: allergies, current medications, past family history, past medical history, past social history, past surgical history and problem list.    Review of Systems    There were no vitals filed for this visit.  Wt Readings from Last 3 Encounters:   10/10/22 84.8 kg (187 lb)   05/12/22 81.6 kg (180 lb)   09/13/21 82.1 kg (181 lb)     Objective   General:  Alert, pleasant, no distress  Psych:  Mood stable, clear thought process    CONTROLLED SUBSTANCE AGREEMENT - SCAN (10/10/2022)   Compliance Drug Analysis, Ur - Urine, Clean Catch (09/01/2022 09:59)       Assessment & Plan   Diagnoses and all orders for this visit:    1. ADHD (attention deficit hyperactivity disorder), inattentive type (Primary)          The patient has read and signed the [facility] Controlled Substance  Contract. I will continue to see patient for regular follow up appointments. They are well controlled on their medication.  SAEID was reviewed and is compliant and will be updated at least every 3 months. The patient is aware of the potential for addiction and dependence. Will provide another prescription for Adderall when needed.      There are no discontinued medications.     There are no Patient Instructions on file for this visit.  Return in about 4 months (around 6/13/2023) for Recheck on ADHD - video visit.  .      Queta Mason, APRN

## 2023-02-17 DIAGNOSIS — F90.0 ADHD (ATTENTION DEFICIT HYPERACTIVITY DISORDER), INATTENTIVE TYPE: ICD-10-CM

## 2023-02-20 RX ORDER — DEXTROAMPHETAMINE SACCHARATE, AMPHETAMINE ASPARTATE, DEXTROAMPHETAMINE SULFATE AND AMPHETAMINE SULFATE 5; 5; 5; 5 MG/1; MG/1; MG/1; MG/1
20 TABLET ORAL 3 TIMES DAILY
Qty: 90 TABLET | Refills: 0 | Status: SHIPPED | OUTPATIENT
Start: 2023-02-20 | End: 2023-03-21 | Stop reason: SDUPTHER

## 2023-03-21 DIAGNOSIS — F90.0 ADHD (ATTENTION DEFICIT HYPERACTIVITY DISORDER), INATTENTIVE TYPE: ICD-10-CM

## 2023-03-21 RX ORDER — DEXTROAMPHETAMINE SACCHARATE, AMPHETAMINE ASPARTATE, DEXTROAMPHETAMINE SULFATE AND AMPHETAMINE SULFATE 5; 5; 5; 5 MG/1; MG/1; MG/1; MG/1
20 TABLET ORAL 3 TIMES DAILY
Qty: 90 TABLET | Refills: 0 | Status: SHIPPED | OUTPATIENT
Start: 2023-03-21

## 2023-04-21 DIAGNOSIS — F90.0 ADHD (ATTENTION DEFICIT HYPERACTIVITY DISORDER), INATTENTIVE TYPE: ICD-10-CM

## 2023-04-21 RX ORDER — DEXTROAMPHETAMINE SACCHARATE, AMPHETAMINE ASPARTATE, DEXTROAMPHETAMINE SULFATE AND AMPHETAMINE SULFATE 5; 5; 5; 5 MG/1; MG/1; MG/1; MG/1
20 TABLET ORAL 3 TIMES DAILY
Qty: 90 TABLET | Refills: 0 | Status: SHIPPED | OUTPATIENT
Start: 2023-04-21

## 2023-05-21 DIAGNOSIS — F90.0 ADHD (ATTENTION DEFICIT HYPERACTIVITY DISORDER), INATTENTIVE TYPE: ICD-10-CM

## 2023-05-24 RX ORDER — DEXTROAMPHETAMINE SACCHARATE, AMPHETAMINE ASPARTATE, DEXTROAMPHETAMINE SULFATE AND AMPHETAMINE SULFATE 5; 5; 5; 5 MG/1; MG/1; MG/1; MG/1
20 TABLET ORAL 3 TIMES DAILY
Qty: 90 TABLET | Refills: 0 | Status: SHIPPED | OUTPATIENT
Start: 2023-05-24

## 2023-07-25 ENCOUNTER — OFFICE VISIT (OUTPATIENT)
Dept: FAMILY MEDICINE CLINIC | Facility: CLINIC | Age: 38
End: 2023-07-25
Payer: COMMERCIAL

## 2023-07-25 VITALS
DIASTOLIC BLOOD PRESSURE: 70 MMHG | OXYGEN SATURATION: 98 % | HEIGHT: 74 IN | TEMPERATURE: 97.3 F | WEIGHT: 188 LBS | BODY MASS INDEX: 24.13 KG/M2 | SYSTOLIC BLOOD PRESSURE: 114 MMHG | HEART RATE: 94 BPM

## 2023-07-25 DIAGNOSIS — F90.0 ADHD (ATTENTION DEFICIT HYPERACTIVITY DISORDER), INATTENTIVE TYPE: ICD-10-CM

## 2023-07-25 DIAGNOSIS — Z79.899 HIGH RISK MEDICATION USE: ICD-10-CM

## 2023-07-25 DIAGNOSIS — F90.0 ADHD (ATTENTION DEFICIT HYPERACTIVITY DISORDER), INATTENTIVE TYPE: Primary | ICD-10-CM

## 2023-07-25 PROCEDURE — 99213 OFFICE O/P EST LOW 20 MIN: CPT | Performed by: NURSE PRACTITIONER

## 2023-07-25 NOTE — PROGRESS NOTES
"  Chief Complaint   Patient presents with    ADHD       Subjective   Bora Siddiqui 37 y.o. male who presents for follow up of for  ADD/ADHD. Patient is well controlled on their current Rx.  Adderall 20 mg three times per day.    Last filled 6/23/23.      No new problems with: insomnia, tachycardia, anxiety, elevated blood pressure or weight loss.     I will continue to see patient for regular follow up appointments.    The medication continues to help with: concentration, finishing tasks in timely manor, and succeeding at school and or at work.           History of Present Illness     The following portions of the patient's history were reviewed and updated as appropriate: allergies, current medications, past family history, past medical history, past social history, past surgical history, and problem list.    Review of Systems    Vitals:    07/25/23 1521   BP: 114/70   Pulse: 94   Temp: 97.3 °F (36.3 °C)   SpO2: 98%   Weight: 85.3 kg (188 lb)   Height: 187.3 cm (73.75\")     Wt Readings from Last 3 Encounters:   07/25/23 85.3 kg (188 lb)   10/10/22 84.8 kg (187 lb)   05/12/22 81.6 kg (180 lb)     Compliance Drug Analysis, Ur - Urine, Clean Catch (09/01/2022 09:59)    CONTROLLED SUBSTANCE AGREEMENT - SCAN (10/10/2022)    Objective   General:  Alert, pleasant, no distress  HEENT:  Normocephalic and atraumatic  Neck:  No thyroid megaly nor lymphadenopathy  Lungs: Normal respiratory rate, Clear auscultation bilaterally.    Heart:: Regular rate, no murmur  Skin: No rash  Neuro:  Cranial nerves grossly normal. No tremor  Psych:  Mood stable, clear thought process    Assessment & Plan   Diagnoses and all orders for this visit:    1. ADHD (attention deficit hyperactivity disorder), inattentive type (Primary)  Continue Adderall 20 mg 3 times a day.      The patient has read and signed the [facility] Controlled Substance Contract. I will continue to see patient for regular follow up appointments. They are well controlled " on their medication.  SAEID was reviewed and is compliant and will be updated at least every 3 months. The patient is aware of the potential for addiction and dependence.Will provide another prescription for Adderall.      There are no discontinued medications.     There are no Patient Instructions on file for this visit.  Return in about 4 months (around 11/25/2023) for Annual with fasting labs the week before.  .      Queta Mason, APRN

## 2023-07-26 RX ORDER — DEXTROAMPHETAMINE SACCHARATE, AMPHETAMINE ASPARTATE, DEXTROAMPHETAMINE SULFATE AND AMPHETAMINE SULFATE 5; 5; 5; 5 MG/1; MG/1; MG/1; MG/1
20 TABLET ORAL 3 TIMES DAILY
Qty: 90 TABLET | Refills: 0 | Status: SHIPPED | OUTPATIENT
Start: 2023-07-26

## 2023-07-31 LAB — DRUGS UR: NORMAL

## 2023-08-19 DIAGNOSIS — F90.0 ADHD (ATTENTION DEFICIT HYPERACTIVITY DISORDER), INATTENTIVE TYPE: ICD-10-CM

## 2023-08-21 RX ORDER — DEXTROAMPHETAMINE SACCHARATE, AMPHETAMINE ASPARTATE, DEXTROAMPHETAMINE SULFATE AND AMPHETAMINE SULFATE 5; 5; 5; 5 MG/1; MG/1; MG/1; MG/1
20 TABLET ORAL 3 TIMES DAILY
Qty: 90 TABLET | Refills: 0 | Status: SHIPPED | OUTPATIENT
Start: 2023-08-21

## 2023-09-19 DIAGNOSIS — J45.20 MILD INTERMITTENT ASTHMA WITHOUT COMPLICATION: ICD-10-CM

## 2023-09-19 DIAGNOSIS — F90.0 ADHD (ATTENTION DEFICIT HYPERACTIVITY DISORDER), INATTENTIVE TYPE: ICD-10-CM

## 2023-09-19 RX ORDER — DEXTROAMPHETAMINE SACCHARATE, AMPHETAMINE ASPARTATE, DEXTROAMPHETAMINE SULFATE AND AMPHETAMINE SULFATE 5; 5; 5; 5 MG/1; MG/1; MG/1; MG/1
20 TABLET ORAL 3 TIMES DAILY
Qty: 90 TABLET | Refills: 0 | Status: SHIPPED | OUTPATIENT
Start: 2023-09-19

## 2023-09-19 RX ORDER — ALBUTEROL SULFATE 90 UG/1
2 AEROSOL, METERED RESPIRATORY (INHALATION) EVERY 4 HOURS PRN
Qty: 18 G | Refills: 2 | Status: SHIPPED | OUTPATIENT
Start: 2023-09-19

## 2023-10-17 DIAGNOSIS — F90.0 ADHD (ATTENTION DEFICIT HYPERACTIVITY DISORDER), INATTENTIVE TYPE: ICD-10-CM

## 2023-10-18 RX ORDER — DEXTROAMPHETAMINE SACCHARATE, AMPHETAMINE ASPARTATE, DEXTROAMPHETAMINE SULFATE AND AMPHETAMINE SULFATE 5; 5; 5; 5 MG/1; MG/1; MG/1; MG/1
20 TABLET ORAL 3 TIMES DAILY
Qty: 90 TABLET | Refills: 0 | Status: SHIPPED | OUTPATIENT
Start: 2023-10-18

## 2023-11-06 NOTE — PATIENT INSTRUCTIONS
Health Maintenance, Male  Adopting a healthy lifestyle and getting preventive care are important in promoting health and wellness. Ask your health care provider about:  · The right schedule for you to have regular tests and exams.  · Things you can do on your own to prevent diseases and keep yourself healthy.  What should I know about diet, weight, and exercise?  Eat a healthy diet    · Eat a diet that includes plenty of vegetables, fruits, low-fat dairy products, and lean protein.  · Do not eat a lot of foods that are high in solid fats, added sugars, or sodium.  Maintain a healthy weight  Body mass index (BMI) is a measurement that can be used to identify possible weight problems. It estimates body fat based on height and weight. Your health care provider can help determine your BMI and help you achieve or maintain a healthy weight.  Get regular exercise  Get regular exercise. This is one of the most important things you can do for your health. Most adults should:  · Exercise for at least 150 minutes each week. The exercise should increase your heart rate and make you sweat (moderate-intensity exercise).  · Do strengthening exercises at least twice a week. This is in addition to the moderate-intensity exercise.  · Spend less time sitting. Even light physical activity can be beneficial.  Watch cholesterol and blood lipids  Have your blood tested for lipids and cholesterol at 20 years of age, then have this test every 5 years.  You may need to have your cholesterol levels checked more often if:  · Your lipid or cholesterol levels are high.  · You are older than 40 years of age.  · You are at high risk for heart disease.  What should I know about cancer screening?  Many types of cancers can be detected early and may often be prevented. Depending on your health history and family history, you may need to have cancer screening at various ages. This may include screening for:  · Colorectal cancer.  · Prostate  cancer.  · Skin cancer.  · Lung cancer.  What should I know about heart disease, diabetes, and high blood pressure?  Blood pressure and heart disease  · High blood pressure causes heart disease and increases the risk of stroke. This is more likely to develop in people who have high blood pressure readings, are of  descent, or are overweight.  · Talk with your health care provider about your target blood pressure readings.  · Have your blood pressure checked:  ? Every 3-5 years if you are 18-39 years of age.  ? Every year if you are 40 years old or older.  · If you are between the ages of 65 and 75 and are a current or former smoker, ask your health care provider if you should have a one-time screening for abdominal aortic aneurysm (AAA).  Diabetes  Have regular diabetes screenings. This checks your fasting blood sugar level. Have the screening done:  · Once every three years after age 45 if you are at a normal weight and have a low risk for diabetes.  · More often and at a younger age if you are overweight or have a high risk for diabetes.  What should I know about preventing infection?  Hepatitis B  If you have a higher risk for hepatitis B, you should be screened for this virus. Talk with your health care provider to find out if you are at risk for hepatitis B infection.  Hepatitis C  Blood testing is recommended for:  · Everyone born from 1945 through 1965.  · Anyone with known risk factors for hepatitis C.  Sexually transmitted infections (STIs)  · You should be screened each year for STIs, including gonorrhea and chlamydia, if:  ? You are sexually active and are younger than 24 years of age.  ? You are older than 24 years of age and your health care provider tells you that you are at risk for this type of infection.  ? Your sexual activity has changed since you were last screened, and you are at increased risk for chlamydia or gonorrhea. Ask your health care provider if you are at risk.  · Ask your  health care provider about whether you are at high risk for HIV. Your health care provider may recommend a prescription medicine to help prevent HIV infection. If you choose to take medicine to prevent HIV, you should first get tested for HIV. You should then be tested every 3 months for as long as you are taking the medicine.  Follow these instructions at home:  Lifestyle  · Do not use any products that contain nicotine or tobacco, such as cigarettes, e-cigarettes, and chewing tobacco. If you need help quitting, ask your health care provider.  · Do not use street drugs.  · Do not share needles.  · Ask your health care provider for help if you need support or information about quitting drugs.  Alcohol use  · Do not drink alcohol if your health care provider tells you not to drink.  · If you drink alcohol:  ? Limit how much you have to 0-2 drinks a day.  ? Be aware of how much alcohol is in your drink. In the [place] one drink equals one 12 oz bottle of beer (355 mL), one 5 oz glass of wine (148 mL), or one 1½ oz glass of hard liquor (44 mL).  General instructions  · Schedule regular health, dental, and eye exams.  · Stay current with your vaccines.  · Tell your health care provider if:  ? You often feel depressed.  ? You have ever been abused or do not feel safe at home.  Summary  · Adopting a healthy lifestyle and getting preventive care are important in promoting health and wellness.  · Follow your health care provider's instructions about healthy diet, exercising, and getting tested or screened for diseases.  · Follow your health care provider's instructions on monitoring your cholesterol and blood pressure.  This information is not intended to replace advice given to you by your health care provider. Make sure you discuss any questions you have with your health care provider.  Document Released: 06/15/2009 Document Revised: 12/11/2019 Document Reviewed: 12/11/2019  [employer] Interactive Patient Education © 2020  [employer]     no

## 2023-11-15 DIAGNOSIS — F90.0 ADHD (ATTENTION DEFICIT HYPERACTIVITY DISORDER), INATTENTIVE TYPE: ICD-10-CM

## 2023-11-16 RX ORDER — DEXTROAMPHETAMINE SACCHARATE, AMPHETAMINE ASPARTATE, DEXTROAMPHETAMINE SULFATE AND AMPHETAMINE SULFATE 5; 5; 5; 5 MG/1; MG/1; MG/1; MG/1
20 TABLET ORAL 3 TIMES DAILY
Qty: 90 TABLET | Refills: 0 | Status: SHIPPED | OUTPATIENT
Start: 2023-11-16